# Patient Record
Sex: FEMALE | Race: WHITE | Employment: OTHER | URBAN - METROPOLITAN AREA
[De-identification: names, ages, dates, MRNs, and addresses within clinical notes are randomized per-mention and may not be internally consistent; named-entity substitution may affect disease eponyms.]

---

## 2017-02-15 ENCOUNTER — OFFICE VISIT (OUTPATIENT)
Dept: INTERNAL MEDICINE CLINIC | Age: 77
End: 2017-02-15

## 2017-02-15 VITALS
WEIGHT: 185 LBS | OXYGEN SATURATION: 98 % | TEMPERATURE: 97.8 F | DIASTOLIC BLOOD PRESSURE: 80 MMHG | HEART RATE: 77 BPM | HEIGHT: 61 IN | BODY MASS INDEX: 34.93 KG/M2 | SYSTOLIC BLOOD PRESSURE: 180 MMHG | RESPIRATION RATE: 16 BRPM

## 2017-02-15 DIAGNOSIS — Z13.39 SCREENING FOR ALCOHOLISM: ICD-10-CM

## 2017-02-15 DIAGNOSIS — E11.9 DIABETES MELLITUS WITHOUT COMPLICATION (HCC): ICD-10-CM

## 2017-02-15 DIAGNOSIS — Z12.31 ENCOUNTER FOR SCREENING MAMMOGRAM FOR BREAST CANCER: ICD-10-CM

## 2017-02-15 DIAGNOSIS — E78.00 PURE HYPERCHOLESTEROLEMIA: ICD-10-CM

## 2017-02-15 DIAGNOSIS — Z00.00 ROUTINE GENERAL MEDICAL EXAMINATION AT A HEALTH CARE FACILITY: ICD-10-CM

## 2017-02-15 DIAGNOSIS — Z00.00 MEDICARE ANNUAL WELLNESS VISIT, SUBSEQUENT: Primary | ICD-10-CM

## 2017-02-15 DIAGNOSIS — M85.80 OSTEOPENIA: ICD-10-CM

## 2017-02-15 NOTE — PROGRESS NOTES
1. Have you been to the ER, urgent care clinic since your last visit? Hospitalized since your last visit? No    2. Have you seen or consulted any other health care providers outside of the [address] [place] [address] since your last visit? Include any pap smears or colon screening.  No   Chief Complaint   Patient presents with   [facility] Annual Wellness Visit     Not fasting

## 2017-02-15 NOTE — PROGRESS NOTES
This is a Subsequent Medicare Annual Wellness Visit providing Personalized Prevention Plan Services (PPPS) (Performed 12 months after initial AWV and PPPS )    I have reviewed the patient's medical history in detail and updated the computerized patient record. History   Here for a pe. She has controlled dm with fbg of 97. She is on insulin and metformin with 30 units am 20 units pm. She has white coat htn off her arb. She is on a statin. She walks daily. No etoh. No cp. She is up to date with c-scope but is due for a mammogram. She has mild osteopenia 2014. Past Medical History   Diagnosis Date    Diabetes ([facility]     Fatty liver     GERD (gastroesophageal reflux disease)     Hypercholesterolemia     Osteopenia 2010    White coat hypertension       Past Surgical History   Procedure Laterality Date    Hx orlin and bso  1987    Hx cataract removal Bilateral 8/2012    Hx colonoscopy  2011     Current Outpatient Prescriptions   Medication Sig Dispense Refill    metFORMIN ER (GLUCOPHAGE XR) 500 mg tablet Take 1 Tab by mouth daily (with dinner). 90 Tab 1    NOVOLIN 70/30 100 unit/mL (70-30) injection INJECT 40 UNITS UNDER THE SKIN EVERY MORNING AND 20 UNITS EVERY EVENING 20 mL 11    atorvastatin (LIPITOR) 80 mg tablet Take 1 Tab by mouth nightly. 90 Tab 1    Insulin Syringe-Needle U-100 (INSULIN SYRINGE) 1/2 mL 30 x 5/16\" syrg 1 Each by Does Not Apply route two (2) times a day. 180 Syringe 3    cyanocobalamin (VITAMIN B-12) 1,000 mcg tablet Take 1,000 mcg by mouth daily.  calcium-cholecalciferol, D3, (CALTRATE 600+D) tablet Take 1 Tab by mouth daily.  MULTIVITAMIN W-MINERALS/LUTEIN (CENTRUM SILVER PO) Take  by mouth. Takes one po daily       CRANBERRY CONC/ASCORBIC ACID (CRANBERRY PLUS VITAMIN C PO) Take 2 Tabs by mouth two (2) times a day.  aspirin 81 mg tablet Take 81 mg by mouth daily as needed.        Allergies   Allergen Reactions    Ace Inhibitors Nausea Only    Glucophage [Metformin] Other (comments)     CURRENTLY TAKES METFORMIN     Family History   Problem Relation Age of Onset    Diabetes Sister     Other Sister      arthritis    Heart Disease Mother     Diabetes Mother     Stroke Father      Social History   Substance Use Topics    Smoking status: Never Smoker    Smokeless tobacco: Never Used    Alcohol use No     Patient Active Problem List   Diagnosis Code    Fatty liver K76.0    White coat hypertension R03.0    Diabetes mellitus without complication ([facility] [address] I46.1    Osteopenia M85.80    Pure hypercholesterolemia E78.00       Depression Risk Factor Screening:     PHQ 2 / 9, over the last two weeks 2/15/2017   Little interest or pleasure in doing things Not at all   Feeling down, depressed or hopeless Not at all   Total Score PHQ 2 0     Alcohol Risk Factor Screening: On any occasion during the past 3 months, have you had more than 3 drinks containing alcohol? No    Do you average more than 7 drinks per week? No      Functional Ability and Level of Safety:     Hearing Loss   none    Activities of Daily Living   Self-care. Requires assistance with: no ADLs    Fall Risk     Fall Risk Assessment, last 12 mths 2/15/2017   Able to walk? Yes   Fall in past 12 months? No     Abuse Screen   Patient is not abused    Review of Systems   A comprehensive review of systems was negative except for that written in the HPI.     Physical Examination     Evaluation of Cognitive Function:  Mood/affect:  happy  Appearance: age appropriate  Family member/caregiver input: none    Visit Vitals    /80 (BP 1 Location: Left arm, BP Patient Position: At rest)    Pulse 77    Temp 97.8 °F (36.6 °C) (Oral)    Resp 16    Ht 5' 0.5\" (1.537 m)    Wt 185 lb (83.9 kg)    SpO2 98%    BMI 35.54 kg/m2   176/80 her cuff      General appearance: alert, cooperative, no distress, appears stated age  Lungs: clear to auscultation bilaterally  Heart: regular rate and rhythm, S1, S2 normal, no murmur, click, rub or gallop    Patient Care Team:  Prachi Kong MD as PCP - General  Homa Solano MD (Ophthalmology)    Advice/Referrals/Counseling   Education and counseling provided:  Are appropriate based on today's review and evaluation    Assessment/Plan   Oz Wilburn was seen today for annual wellness visit. Diagnoses and all orders for this visit:    Medicare annual wellness visit, subsequent    Diabetes mellitus without complication ([facility] [address]  -     HEMOGLOBIN A1C WITH EAG  -     METABOLIC PANEL, COMPREHENSIVE  -     MICROALBUMIN, UR, RAND W/ MICROALBUMIN/CREA RATIO  -     CBC WITH AUTOMATED DIFF  Reduce insulin to 15 units PM.    Pure hypercholesterolemia  -     LIPID PANEL  The current medical regimen is effective;  continue present plan and medications. White coat hypertension  -     AMB POC EKG ROUTINE W/ 12 LEADS, INTER & REP  Monitor at home. Encounter for screening mammogram for breast cancer  -     PATRICIA MAMMO BI SCREENING INCL CAD; Future    Osteopenia  Recheck two years. Reviewed plan of care with the patient who has provided input and agrees with the goals  .

## 2017-02-15 NOTE — MR AVS SNAPSHOT
Visit Information Date & Time Provider Department Dept. Phone Encounter #  
 2/15/2017  2:00 PM Williams Marcum MD [facility] [phone] [id number] Follow-up Instructions Return in about 3 months (around 5/15/2017). Follow-up and Disposition History Your Appointments 5/17/2017  9:15 AM  
ROUTINE CARE with Williams Marcum MD  
[facility] [address]) Appt Note: f/up w labs [place] [address] [facility] [address]  
[address] [facility] [address] [address] Upcoming Health Maintenance Date Due DTaP/Tdap/Td series (1 - Tdap) 12/19/1961 MICROALBUMIN Q1 2/9/2017 HEMOGLOBIN A1C Q6M 5/2/2017 FOOT EXAM Q1 5/20/2017 MEDICARE YEARLY EXAM 5/21/2017 LIPID PANEL Q1 11/2/2017 EYE EXAM RETINAL OR DILATED Q1 1/13/2018 GLAUCOMA SCREENING Q2Y 1/13/2019 COLONOSCOPY 1/31/2022 Allergies as of 2/15/2017  Review Complete On: 2/15/2017 By: Es Vasquez Severity Noted Reaction Type Reactions Ace Inhibitors  11/06/2012    Nausea Only Glucophage [Metformin]  04/12/2010    Other (comments) CURRENTLY TAKES METFORMIN Current Immunizations  Reviewed on 9/15/2016 Name Date Influenza High Dose Vaccine PF 9/13/2016, 9/7/2015 Influenza Vaccine 9/24/2014, 9/12/2013 Influenza Vaccine Split 10/24/2012 Pneumococcal Conjugate (PCV-13) 9/7/2015 Pneumococcal Vaccine (Unspecified Type) 11/6/2009, 6/8/2009 Zoster Vaccine, Live 10/1/2013 Not reviewed this visit You Were Diagnosed With   
  
 Codes Comments Medicare annual wellness visit, subsequent    -  Primary ICD-10-CM: Z00.00 ICD-9-CM: V70.0 [facility] mellitus [facility] complication [facility]     KIARA-47-GT: E11.9 ICD-9-CM: 250.00 Pure hypercholesterolemia     ICD-10-CM: E78.00 ICD-9-CM: 272.0 White coat hypertension     ICD-10-CM: R03.0 ICD-9-CM: 796.2 Encounter for screening mammogram for breast cancer     ICD-10-CM: Z12.31 
ICD-9-CM: V76.12 Osteopenia     ICD-10-CM: M85.80 ICD-9-CM: 733.90 Routine general medical examination at a health care facility     ICD-10-CM: Z00.00 ICD-9-CM: V70.0 Screening for alcoholism     ICD-10-CM: Z13.89 ICD-9-CM: V79.1 Vitals BP Pulse Temp Resp Height(growth percentile) Weight(growth percentile) 180/80 (BP 1 Location: Left arm, BP Patient Position: At rest) 77 97.8 °F (36.6 °C) (Oral) 16 5' 0.5\" (1.537 m) 185 lb (83.9 kg) SpO2 BMI OB Status Smoking Status 98% 35.54 kg/m2 Hysterectomy Never Smoker Vitals History BMI and BSA Data Body Mass Index Body Surface Area 35.54 kg/m 2 1.89 m 2 Preferred Pharmacy Pharmacy Name Phone [address] [phone] Your Updated Medication List  
  
   
This list is accurate as of: 2/15/17  2:38 PM.  Always use your most recent med list.  
  
  
  
  
 aspirin 81 mg tablet Take 81 mg by mouth daily as needed. atorvastatin 80 mg tablet Commonly known as:  LIPITOR Take 1 Tab by mouth nightly. calcium-cholecalciferol (D3) tablet Commonly known as:  CALTRATE 600+D Take 1 Tab by mouth daily. CENTRUM SILVER PO Take  by mouth. Takes one po daily CRANBERRY PLUS VITAMIN C PO Take 2 Tabs by mouth two (2) times a day. Insulin Syringe-Needle U-100 1/2 mL 30 gauge x 5/16 Syrg Commonly known as:  INSULIN SYRINGE  
1 Each by Does Not Apply route two (2) times a day. metFORMIN  mg tablet Commonly known as:  GLUCOPHAGE XR Take 1 Tab by mouth daily (with dinner). NovoLIN 70/30 100 unit/mL (70-30) injection Generic drug:  insulin NPH/insulin regular INJECT 40 UNITS UNDER THE SKIN EVERY MORNING AND 20 UNITS EVERY EVENING  
  
 VITAMIN B-12 1,000 mcg tablet Generic drug:  cyanocobalamin Take 1,000 mcg by mouth daily. We Performed the Following AMB POC EKG ROUTINE W/ 12 LEADS, INTER & REP [66586 CPT(R)] CBC WITH AUTOMATED DIFF [96364 CPT(R)] HEMOGLOBIN A1C WITH EAG [25651 CPT(R)] LIPID PANEL [24495 CPT(R)] METABOLIC PANEL, COMPREHENSIVE [21224 CPT(R)] MICROALBUMIN, UR, RAND W/ MICROALBUMIN/CREA RATIO [id number] CPT(R)] Follow-up Instructions Return in about 3 months (around 5/15/2017). To-Do List   
 02/15/2017 Imaging:  PATRICIA MAMMO BI SCREENING INCL CAD Introducing [facility] & HEALTH SERVICES! [facility] introduces [employer]ish patient portal. Now you can access parts of your medical record, email your doctor's office, and request medication refills online. 1. In your internet browser, go to [email or web address] 2. Click on the First Time User? Click Here link in the Sign In box. You will see the New Member Sign Up page. 3. Enter your [employer]ish Access Code exactly as it appears below. You will not need to use this code after youve completed the sign-up process. If you do not sign up before the expiration date, you must request a new code. · [employer] Access Code: [email or web address] Expires: 5/16/2017  2:37 PM 
 
4. Enter the last four digits of your Social Security Number (xxxx) and Date of Birth (mm/dd/yyyy) as indicated and click Submit. You will be taken to the next sign-up page. 5. Create a addwish ID. This will be your addwish login ID and cannot be changed, so think of one that is secure and easy to remember. 6. Create a addwish password. You can change your password at any time. 7. Enter your Password Reset Question and Answer. This can be used at a later time if you forget your password. 8. Enter your e-mail address. You will receive e-mail notification when new information is available in [address] 9. Click Sign Up. You can now view and download portions of your medical record. 10. Click the Download Summary menu link to download a portable copy of your medical information. If you have questions, please visit the Frequently Asked Questions section of the [employer] website. Remember, [employer] is NOT to be used for urgent needs. For medical emergencies, dial 911. Now available from your iPhone and Android! Please provide this summary of care documentation to your next provider. Your primary care clinician is listed as [address] [place] [address] [address] If you have any questions after today's visit, please call [phone].

## 2017-02-22 DIAGNOSIS — E11.9 DIABETES MELLITUS WITHOUT COMPLICATION (HCC): ICD-10-CM

## 2017-02-22 RX ORDER — NAPROXEN SODIUM 220 MG
TABLET ORAL
Qty: 180 SYRINGE | Refills: 3 | Status: SHIPPED | OUTPATIENT
Start: 2017-02-22 | End: 2018-02-14 | Stop reason: SDUPTHER

## 2017-03-03 ENCOUNTER — HOSPITAL ENCOUNTER (OUTPATIENT)
Dept: LAB | Age: 77
Discharge: HOME | End: 2017-03-03
Payer: MEDICARE

## 2017-03-03 PROCEDURE — 80053 COMPREHEN METABOLIC PANEL: CPT

## 2017-03-03 PROCEDURE — 36415 COLL VENOUS BLD VENIPUNCTURE: CPT

## 2017-03-03 PROCEDURE — 80061 LIPID PANEL: CPT

## 2017-03-03 PROCEDURE — 83036 HEMOGLOBIN GLYCOSYLATED A1C: CPT

## 2017-03-03 PROCEDURE — 82043 UR ALBUMIN QUANTITATIVE: CPT

## 2017-03-03 PROCEDURE — 85025 COMPLETE CBC W/AUTO DIFF WBC: CPT

## 2017-03-04 LAB
ALBUMIN SERPL-MCNC: 4.4 G/DL (ref 3.5–4.8)
ALBUMIN/CREAT UR: 11.6 MG/G CREAT (ref 0–30)
ALBUMIN/GLOB SERPL: 1.7 {RATIO} (ref 1.1–2.5)
ALP SERPL-CCNC: 97 IU/L (ref 39–117)
ALT SERPL-CCNC: 18 IU/L (ref 0–32)
AST SERPL-CCNC: 16 IU/L (ref 0–40)
BASOPHILS # BLD AUTO: 0 X10E3/UL (ref 0–0.2)
BASOPHILS NFR BLD AUTO: 0 %
BILIRUB SERPL-MCNC: 0.7 MG/DL (ref 0–1.2)
BUN SERPL-MCNC: 15 MG/DL (ref 8–27)
BUN/CREAT SERPL: 17 (ref 11–26)
CALCIUM SERPL-MCNC: 9.7 MG/DL (ref 8.7–10.3)
CHLORIDE SERPL-SCNC: 98 MMOL/L (ref 96–106)
CHOLEST SERPL-MCNC: 158 MG/DL (ref 100–199)
CO2 SERPL-SCNC: 26 MMOL/L (ref 18–29)
CREAT SERPL-MCNC: 0.86 MG/DL (ref 0.57–1)
CREAT UR-MCNC: 51.9 MG/DL
EOSINOPHIL # BLD AUTO: 0.1 X10E3/UL (ref 0–0.4)
EOSINOPHIL NFR BLD AUTO: 1 %
ERYTHROCYTE [DISTWIDTH] IN BLOOD BY AUTOMATED COUNT: 13 % (ref 12.3–15.4)
EST. AVERAGE GLUCOSE BLD GHB EST-MCNC: 134 MG/DL
GLOBULIN SER CALC-MCNC: 2.6 G/DL (ref 1.5–4.5)
GLUCOSE SERPL-MCNC: 131 MG/DL (ref 65–99)
HBA1C MFR BLD: 6.3 % (ref 4.8–5.6)
HCT VFR BLD AUTO: 40.4 % (ref 34–46.6)
HDLC SERPL-MCNC: 46 MG/DL
HGB BLD-MCNC: 13.7 G/DL (ref 11.1–15.9)
IMM GRANULOCYTES # BLD: 0 X10E3/UL (ref 0–0.1)
IMM GRANULOCYTES NFR BLD: 0 %
INTERPRETATION, 910389: NORMAL
LDLC SERPL CALC-MCNC: 78 MG/DL (ref 0–99)
LYMPHOCYTES # BLD AUTO: 2.4 X10E3/UL (ref 0.7–3.1)
LYMPHOCYTES NFR BLD AUTO: 41 %
Lab: NORMAL
MCH RBC QN AUTO: 30.2 PG (ref 26.6–33)
MCHC RBC AUTO-ENTMCNC: 33.9 G/DL (ref 31.5–35.7)
MCV RBC AUTO: 89 FL (ref 79–97)
MICROALBUMIN UR-MCNC: 6 UG/ML
MONOCYTES # BLD AUTO: 0.3 X10E3/UL (ref 0.1–0.9)
MONOCYTES NFR BLD AUTO: 5 %
NEUTROPHILS # BLD AUTO: 3.1 X10E3/UL (ref 1.4–7)
NEUTROPHILS NFR BLD AUTO: 53 %
PLATELET # BLD AUTO: 155 X10E3/UL (ref 150–379)
POTASSIUM SERPL-SCNC: 4.5 MMOL/L (ref 3.5–5.2)
PROT SERPL-MCNC: 7 G/DL (ref 6–8.5)
RBC # BLD AUTO: 4.54 X10E6/UL (ref 3.77–5.28)
SODIUM SERPL-SCNC: 137 MMOL/L (ref 134–144)
TRIGL SERPL-MCNC: 171 MG/DL (ref 0–149)
VLDLC SERPL CALC-MCNC: 34 MG/DL (ref 5–40)
WBC # BLD AUTO: 5.8 X10E3/UL (ref 3.4–10.8)

## 2017-03-17 ENCOUNTER — HOSPITAL ENCOUNTER (OUTPATIENT)
Dept: MAMMOGRAPHY | Age: 77
Discharge: HOME | End: 2017-03-17
Payer: MEDICARE

## 2017-03-17 DIAGNOSIS — Z12.31 ENCOUNTER FOR SCREENING MAMMOGRAM FOR BREAST CANCER: ICD-10-CM

## 2017-03-17 PROCEDURE — 77067 SCR MAMMO BI INCL CAD: CPT

## 2017-05-17 ENCOUNTER — OFFICE VISIT (OUTPATIENT)
Dept: INTERNAL MEDICINE CLINIC | Age: 77
End: 2017-05-17

## 2017-05-17 VITALS
RESPIRATION RATE: 16 BRPM | DIASTOLIC BLOOD PRESSURE: 81 MMHG | HEART RATE: 81 BPM | TEMPERATURE: 97.1 F | SYSTOLIC BLOOD PRESSURE: 158 MMHG | WEIGHT: 180.2 LBS | OXYGEN SATURATION: 98 % | HEIGHT: 60 IN | BODY MASS INDEX: 35.38 KG/M2

## 2017-05-17 DIAGNOSIS — E78.00 PURE HYPERCHOLESTEROLEMIA: ICD-10-CM

## 2017-05-17 DIAGNOSIS — R03.0 WHITE COAT SYNDROME WITHOUT HYPERTENSION: ICD-10-CM

## 2017-05-17 DIAGNOSIS — E11.9 DIABETES MELLITUS WITHOUT COMPLICATION (HCC): Primary | ICD-10-CM

## 2017-05-17 NOTE — PROGRESS NOTES
HISTORY OF PRESENT ILLNESS  Robert Gaona is a 78 y.o. female. HPI  Here for dm. She is well controlled on insulin 40 units am 20 units pm. She gets some hypoglycemia. She is on a statin for hld. She has white coat htn. She is active gardening. No cp. Past Medical History:   Diagnosis Date    Diabetes ([facility] [address]     Fatty liver     GERD (gastroesophageal reflux disease)     Hypercholesterolemia     Osteopenia 2010    White coat hypertension      Current Outpatient Prescriptions   Medication Sig    atorvastatin (LIPITOR) 80 mg tablet TAKE 1 TABLET BY MOUTH EVERY EVENING    Insulin Syringe-Needle U-100 1/2 mL 30 gauge syrg USE AS DIRECTED TWICE DAILY    metFORMIN ER (GLUCOPHAGE XR) 500 mg tablet Take 1 Tab by mouth daily (with dinner).  NOVOLIN 70/30 100 unit/mL (70-30) injection INJECT 40 UNITS UNDER THE SKIN EVERY MORNING AND 20 UNITS EVERY EVENING    cyanocobalamin (VITAMIN B-12) 1,000 mcg tablet Take 1,000 mcg by mouth daily.  calcium-cholecalciferol, D3, (CALTRATE 600+D) tablet Take 1 Tab by mouth daily.  CRANBERRY CONC/ASCORBIC ACID (CRANBERRY PLUS VITAMIN C PO) Take 2 Tabs by mouth two (2) times a day.  aspirin 81 mg tablet Take 81 mg by mouth daily as needed.  MULTIVITAMIN W-MINERALS/LUTEIN (CENTRUM SILVER PO) Take  by mouth. Takes one po daily      No current facility-administered medications for this visit. Review of Systems   All other systems reviewed and are negative. Visit Vitals    /81 (BP 1 Location: Left arm, BP Patient Position: At rest)    Pulse 81    Temp 97.1 °F (36.2 °C) (Oral)    Resp 16    Ht 5' (1.524 m)    Wt 180 lb 3.2 oz (81.7 kg)    SpO2 98%    BMI 35.19 kg/m2       Physical Exam   Constitutional: She appears well-developed and well-nourished. Cardiovascular: Normal rate, regular rhythm and normal heart sounds. Pulmonary/Chest: Effort normal and breath sounds normal. No respiratory distress. She has no wheezes. She has no rales. Musculoskeletal:   Foot exam - bilateral normal; no swelling, tenderness or skin or vascular lesions. Color and temperature is normal. Sensation is intact. Peripheral pulses are palpable. Toenails are normal.     Nursing note and vitals reviewed. Lab Results   Component Value Date/Time    Hemoglobin A1c 6.3 03/03/2017 09:40 AM    Hemoglobin A1c (POC) 8.0 08/04/2015 09:44 AM     Lab Results   Component Value Date/Time    Cholesterol, total 158 03/03/2017 09:40 AM    HDL Cholesterol 46 03/03/2017 09:40 AM    LDL, calculated 78 03/03/2017 09:40 AM    VLDL, calculated 34 03/03/2017 09:40 AM    Triglyceride 171 03/03/2017 09:40 AM    CHOL/HDL Ratio 4.1 09/08/2010 10:15 AM       ASSESSMENT and Evaline Lines was seen today for diabetes. Diagnoses and all orders for this visit:    Diabetes mellitus without complication ([facility] [address]  -     HEMOGLOBIN A1C WITH EAG   reduce insulin to 30 units am 15 units pm. Reduce by 5 units if BG<100 with these changes. Pure hypercholesterolemia  -     METABOLIC PANEL, COMPREHENSIVE  -     LIPID PANEL   The current medical regimen is effective;  continue present plan and medications.  Diabetic Foot Exam.    White coat syndrome without hypertension  No Rx needed. Bring cuff next visit.       Reviewed plan of care with the patient who has provided input and agrees with the goals

## 2017-05-17 NOTE — PROGRESS NOTES
1. Have you been to the ER, urgent care clinic since your last visit? Hospitalized since your last visit? No    2. Have you seen or consulted any other health care providers outside of the [address] [place] [address] since your last visit? Include any pap smears or colon screening.  No   Chief Complaint   Patient presents with    Diabetes     follow up     Fasting

## 2017-05-17 NOTE — MR AVS SNAPSHOT
Visit Information Date & Time Provider Department Dept. Phone Encounter #  
 5/17/2017  9:15 AM Gerhardt Duel, MD [facility] [phone] [id number] Upcoming Health Maintenance Date Due DTaP/Tdap/Td series (1 - Tdap) 12[birth year]19/1961 MEDICARE YEARLY EXAM 5/21/2017 INFLUENZA AGE 9 TO ADULT 8/1/2017 HEMOGLOBIN A1C Q6M 9/3/2017 EYE EXAM RETINAL OR DILATED Q1 1/13/2018 MICROALBUMIN Q1 3/3/2018 LIPID PANEL Q1 3/3/2018 GLAUCOMA SCREENING Q2Y 1/13/2019 COLONOSCOPY 1/31/2022 Allergies as of 5/17/2017  Review Complete On: 5/17/2017 By: Isaias Linda Severity Noted Reaction Type Reactions Ace Inhibitors  11/06/2012    Nausea Only Glucophage [Metformin]  04/12/2010    Other (comments) CURRENTLY TAKES METFORMIN Current Immunizations  Reviewed on 5/17/2017 Name Date Influenza High Dose Vaccine PF 9/13/2016, 9/7/2015 Influenza Vaccine 9/24/2014, 9/12/2013 Influenza Vaccine Split 10/24/2012 Pneumococcal Conjugate (PCV-13) 9/7/2015 Pneumococcal Vaccine (Unspecified Type) 11/6/2009, 6/8/2009 Zoster Vaccine, Live 10/1/2013 Reviewed by Gerhardt Duel, MD on 5/17/2017 at  9:11 AM  
You Were Diagnosed With   
  
 Codes Comments Diabetes mellitus without complication ([facility]    -  Primary ICD-10-CM: E11.9 ICD-9-CM: 250.00 Pure hypercholesterolemia     ICD-10-CM: E78.00 ICD-9-CM: 272.0 White coat syndrome without hypertension     ICD-10-CM: R03.0 ICD-9-CM: 796.2 Vitals BP Pulse Temp Resp Height(growth percentile) Weight(growth percentile) 158/81 (BP 1 Location: Left arm, BP Patient Position: At rest) 81 97.1 °F (36.2 °C) (Oral) 16 5' (1.524 m) 180 lb 3.2 oz (81.7 kg) SpO2 BMI OB Status Smoking Status 98% 35.19 kg/m2 Hysterectomy Never Smoker BMI and BSA Data Body Mass Index Body Surface Area  
 35.19 kg/m 2 1.86 m 2 Preferred Pharmacy Pharmacy Name Phone [address]  [phone] Your Updated Medication List  
  
   
This list is accurate as of: 5/17/17  9:32 AM.  Always use your most recent med list.  
  
  
  
  
 aspirin 81 mg tablet Take 81 mg by mouth daily as needed. atorvastatin 80 mg tablet Commonly known as:  LIPITOR  
TAKE 1 TABLET BY MOUTH EVERY EVENING  
  
 calcium-cholecalciferol (D3) tablet Commonly known as:  CALTRATE 600+D Take 1 Tab by mouth daily. CENTRUM SILVER PO Take  by mouth. Takes one po daily CRANBERRY PLUS VITAMIN C PO Take 2 Tabs by mouth two (2) times a day. Insulin Syringe-Needle U-100 1/2 mL 30 gauge Syrg USE AS DIRECTED TWICE DAILY  
  
 metFORMIN  mg tablet Commonly known as:  GLUCOPHAGE XR Take 1 Tab by mouth daily (with dinner). NovoLIN 70/30 100 unit/mL (70-30) injection Generic drug:  insulin NPH/insulin regular INJECT 40 UNITS UNDER THE SKIN EVERY MORNING AND 20 UNITS EVERY EVENING  
  
 VITAMIN B-12 1,000 mcg tablet Generic drug:  cyanocobalamin Take 1,000 mcg by mouth daily. We Performed the Following HEMOGLOBIN A1C WITH EAG [44017 CPT(R)] LIPID PANEL [70086 CPT(R)] METABOLIC PANEL, COMPREHENSIVE [14392 CPT(R)] Introducing [facility] & HEALTH SERVICES! [facility] introduces [employer] patient portal. Now you can access parts of your medical record, email your doctor's office, and request medication refills online. 1. In your internet browser, go to [email or web address] 2. Click on the First Time User? Click Here link in the Sign In box. You will see the New Member Sign Up page. 3. Enter your [employer] Access Code exactly as it appears below. You will not need to use this code after youve completed the sign-up process. If you do not sign up before the expiration date, you must request a new code. · [employer] Access Code: [email or web address] Expires: 8/15/2017  9:32 AM 
 
4. Enter the last four digits of your Social Security Number (xxxx) and Date of Birth (mm/dd/yyyy) as indicated and click Submit. You will be taken to the next sign-up page. 5. Create a [employer] ID. This will be your [employer] login ID and cannot be changed, so think of one that is secure and easy to remember. 6. Create a [employer] password. You can change your password at any time. 7. Enter your Password Reset Question and Answer. This can be used at a later time if you forget your password. 8. Enter your e-mail address. You will receive e-mail notification when new information is available in [address] 9. Click Sign Up. You can now view and download portions of your medical record. 10. Click the Download Summary menu link to download a portable copy of your medical information. If you have questions, please visit the Frequently Asked Questions section of the [employer] website. Remember, [employer] is NOT to be used for urgent needs. For medical emergencies, dial 911. Now available from your iPhone and Android! Please provide this summary of care documentation to your next provider. Your primary care clinician is listed as [address] [address] [place] [address] [address]. If you have any questions after today's visit, please call [phone].

## 2017-06-16 ENCOUNTER — HOSPITAL ENCOUNTER (OUTPATIENT)
Dept: LAB | Age: 77
Discharge: HOME | End: 2017-06-16
Payer: MEDICARE

## 2017-06-16 ENCOUNTER — OFFICE VISIT (OUTPATIENT)
Dept: INTERNAL MEDICINE CLINIC | Age: 77
End: 2017-06-16

## 2017-06-16 ENCOUNTER — TELEPHONE (OUTPATIENT)
Dept: INTERNAL MEDICINE CLINIC | Age: 77
End: 2017-06-16

## 2017-06-16 VITALS
BODY MASS INDEX: 36.05 KG/M2 | HEIGHT: 60 IN | HEART RATE: 73 BPM | DIASTOLIC BLOOD PRESSURE: 74 MMHG | RESPIRATION RATE: 18 BRPM | WEIGHT: 183.6 LBS | SYSTOLIC BLOOD PRESSURE: 169 MMHG | TEMPERATURE: 97.4 F | OXYGEN SATURATION: 98 %

## 2017-06-16 DIAGNOSIS — R03.0 WHITE COAT SYNDROME WITHOUT HYPERTENSION: ICD-10-CM

## 2017-06-16 DIAGNOSIS — Z71.89 LIVING WILL, COUNSELING/DISCUSSION: ICD-10-CM

## 2017-06-16 DIAGNOSIS — Z23 NEED FOR TDAP VACCINATION: ICD-10-CM

## 2017-06-16 DIAGNOSIS — E78.00 PURE HYPERCHOLESTEROLEMIA: ICD-10-CM

## 2017-06-16 DIAGNOSIS — E11.9 DIABETES MELLITUS WITHOUT COMPLICATION (HCC): Primary | ICD-10-CM

## 2017-06-16 PROCEDURE — 36415 COLL VENOUS BLD VENIPUNCTURE: CPT

## 2017-06-16 PROCEDURE — 80053 COMPREHEN METABOLIC PANEL: CPT

## 2017-06-16 PROCEDURE — 83036 HEMOGLOBIN GLYCOSYLATED A1C: CPT

## 2017-06-16 PROCEDURE — 80061 LIPID PANEL: CPT

## 2017-06-16 NOTE — PROGRESS NOTES
HISTORY OF PRESENT ILLNESS  Nelida Matthews is a 78 y.o. female. HPI  Here for dm. She is well controlled on insulin. We reduced her evening dose last visit. No hypoglycemia. She is on a statin for hld. She has white coat htn. She has a living will. She needs tdap. Past Medical History:   Diagnosis Date    Diabetes ([facility] [address] [facility]     Fatty liver     GERD (gastroesophageal reflux disease)     Hypercholesterolemia     Osteopenia 2010    White coat hypertension    ]  Current Outpatient Prescriptions   Medication Sig    atorvastatin (LIPITOR) 80 mg tablet TAKE 1 TABLET BY MOUTH EVERY EVENING    Insulin Syringe-Needle U-100 1/2 mL 30 gauge syrg USE AS DIRECTED TWICE DAILY    metFORMIN ER (GLUCOPHAGE XR) 500 mg tablet Take 1 Tab by mouth daily (with dinner).  NOVOLIN 70/30 100 unit/mL (70-30) injection INJECT 40 UNITS UNDER THE SKIN EVERY MORNING AND 20 UNITS EVERY EVENING    cyanocobalamin (VITAMIN B-12) 1,000 mcg tablet Take 1,000 mcg by mouth daily.  calcium-cholecalciferol, D3, (CALTRATE 600+D) tablet Take 1 Tab by mouth daily.  MULTIVITAMIN W-MINERALS/LUTEIN (CENTRUM SILVER PO) Take  by mouth. Takes one po daily     CRANBERRY CONC/ASCORBIC ACID (CRANBERRY PLUS VITAMIN C PO) Take 2 Tabs by mouth two (2) times a day.  aspirin 81 mg tablet Take 81 mg by mouth daily as needed. No current facility-administered medications for this visit. Review of Systems   All other systems reviewed and are negative. Visit Vitals    /74 (BP 1 Location: Right arm, BP Patient Position: At rest)    Pulse 73    Temp 97.4 °F (36.3 °C) (Oral)    Resp 18    Ht 5' (1.524 m)    Wt 183 lb 9.6 oz (83.3 kg)    SpO2 98%    BMI 35.86 kg/m2       Physical Exam   Constitutional: She appears well-developed and well-nourished. Cardiovascular: Normal rate, regular rhythm and normal heart sounds. Pulmonary/Chest: Effort normal and breath sounds normal. No respiratory distress. She has no wheezes.  She has no rales. Nursing note and vitals reviewed. Lab Results   Component Value Date/Time    Hemoglobin A1c 6.3 03/03/2017 09:40 AM    Hemoglobin A1c (POC) 8.0 08/04/2015 09:44 AM     Lab Results   Component Value Date/Time    Cholesterol, total 158 03/03/2017 09:40 AM    HDL Cholesterol 46 03/03/2017 09:40 AM    LDL, calculated 78 03/03/2017 09:40 AM    VLDL, calculated 34 03/03/2017 09:40 AM    Triglyceride 171 03/03/2017 09:40 AM    CHOL/HDL Ratio 4.1 09/08/2010 10:15 AM       ASSESSMENT and Sanjeev Garcia was seen today for annual wellness visit. Diagnoses and all orders for this visit:    Diabetes mellitus without complication ([facility] [address] [facility]  The current medical regimen is effective;  continue present plan and medications. Pure hypercholesterolemia  The current medical regimen is effective;  continue present plan and medications. White coat syndrome without hypertension  Monitor at home. Need for Tdap vaccination  Rx given. Living will, counseling/discussion   she has already.       Reviewed plan of care with the patient who has provided input and agrees with the goals

## 2017-06-16 NOTE — PROGRESS NOTES
1. Have you been to the ER, urgent care clinic since your last visit? Hospitalized since your last visit? No    2. Have you seen or consulted any other health care providers outside of the [address] [place] [address] since your last visit? Include any pap smears or colon screening.  No   Chief Complaint   Patient presents with   [facility] Annual Wellness Visit     Not fasting

## 2017-06-17 LAB
ALBUMIN SERPL-MCNC: 4.6 G/DL (ref 3.5–4.8)
ALBUMIN/GLOB SERPL: 1.8 {RATIO} (ref 1.2–2.2)
ALP SERPL-CCNC: 104 IU/L (ref 39–117)
ALT SERPL-CCNC: 20 IU/L (ref 0–32)
AST SERPL-CCNC: 19 IU/L (ref 0–40)
BILIRUB SERPL-MCNC: 0.7 MG/DL (ref 0–1.2)
BUN SERPL-MCNC: 20 MG/DL (ref 8–27)
BUN/CREAT SERPL: 20 (ref 12–28)
CALCIUM SERPL-MCNC: 10.1 MG/DL (ref 8.7–10.3)
CHLORIDE SERPL-SCNC: 100 MMOL/L (ref 96–106)
CHOLEST SERPL-MCNC: 201 MG/DL (ref 100–199)
CO2 SERPL-SCNC: 20 MMOL/L (ref 18–29)
CREAT SERPL-MCNC: 1 MG/DL (ref 0.57–1)
EST. AVERAGE GLUCOSE BLD GHB EST-MCNC: 148 MG/DL
GLOBULIN SER CALC-MCNC: 2.6 G/DL (ref 1.5–4.5)
GLUCOSE SERPL-MCNC: 144 MG/DL (ref 65–99)
HBA1C MFR BLD: 6.8 % (ref 4.8–5.6)
HDLC SERPL-MCNC: 51 MG/DL
INTERPRETATION, 910389: NORMAL
INTERPRETATION: NORMAL
LDLC SERPL CALC-MCNC: 101 MG/DL (ref 0–99)
Lab: NORMAL
PDF IMAGE, 910387: NORMAL
POTASSIUM SERPL-SCNC: 4.6 MMOL/L (ref 3.5–5.2)
PROT SERPL-MCNC: 7.2 G/DL (ref 6–8.5)
SODIUM SERPL-SCNC: 143 MMOL/L (ref 134–144)
TRIGL SERPL-MCNC: 245 MG/DL (ref 0–149)
VLDLC SERPL CALC-MCNC: 49 MG/DL (ref 5–40)

## 2017-09-21 ENCOUNTER — HOSPITAL ENCOUNTER (OUTPATIENT)
Dept: LAB | Age: 77
Discharge: HOME | End: 2017-09-21
Payer: MEDICARE

## 2017-09-21 ENCOUNTER — OFFICE VISIT (OUTPATIENT)
Dept: INTERNAL MEDICINE CLINIC | Age: 77
End: 2017-09-21

## 2017-09-21 VITALS
HEART RATE: 78 BPM | HEIGHT: 60 IN | DIASTOLIC BLOOD PRESSURE: 74 MMHG | OXYGEN SATURATION: 97 % | SYSTOLIC BLOOD PRESSURE: 158 MMHG | TEMPERATURE: 97.4 F | WEIGHT: 183 LBS | BODY MASS INDEX: 35.93 KG/M2 | RESPIRATION RATE: 16 BRPM

## 2017-09-21 DIAGNOSIS — E78.00 PURE HYPERCHOLESTEROLEMIA: ICD-10-CM

## 2017-09-21 DIAGNOSIS — R03.0 WHITE COAT SYNDROME WITHOUT HYPERTENSION: ICD-10-CM

## 2017-09-21 DIAGNOSIS — E11.9 DIABETES MELLITUS WITHOUT COMPLICATION (HCC): Primary | ICD-10-CM

## 2017-09-21 PROCEDURE — 36415 COLL VENOUS BLD VENIPUNCTURE: CPT

## 2017-09-21 PROCEDURE — 83036 HEMOGLOBIN GLYCOSYLATED A1C: CPT

## 2017-09-21 PROCEDURE — 80053 COMPREHEN METABOLIC PANEL: CPT

## 2017-09-21 PROCEDURE — 80061 LIPID PANEL: CPT

## 2017-09-21 NOTE — PROGRESS NOTES
HISTORY OF PRESENT ILLNESS  Wandy Sosa is a 78 y.o. female. HPI  Here for DM. She is well controlled on insulin with oral agents and checks her BG twice daily with readings 113-160. No hypoglycemia since we reduced her dose. Her BP is normal at 130/80 at home. She is on a statin for HLD. Past Medical History:   Diagnosis Date    Diabetes ([address]     Fatty liver     GERD (gastroesophageal reflux disease)     Hypercholesterolemia     Osteopenia 2010    White coat hypertension      Current Outpatient Prescriptions on File Prior to Visit   Medication Sig Dispense Refill    metFORMIN ER (GLUCOPHAGE XR) 500 mg tablet TAKE 1 TABLET BY MOUTH DAILY WITH DINNER 90 Tab 1    atorvastatin (LIPITOR) 80 mg tablet TAKE 1 TABLET BY MOUTH EVERY EVENING 90 Tab 3    Insulin Syringe-Needle U-100 1/2 mL 30 gauge syrg USE AS DIRECTED TWICE DAILY 180 Syringe 3    NOVOLIN 70/30 100 unit/mL (70-30) injection INJECT 40 UNITS UNDER THE SKIN EVERY MORNING AND 20 UNITS EVERY EVENING 20 mL 11    cyanocobalamin (VITAMIN B-12) 1,000 mcg tablet Take 1,000 mcg by mouth daily.  MULTIVITAMIN W-MINERALS/LUTEIN (CENTRUM SILVER PO) Take  by mouth. Takes one po daily        No current facility-administered medications on file prior to visit. Review of Systems   All other systems reviewed and are negative. Visit Vitals    /74 (BP 1 Location: Left arm, BP Patient Position: At rest)    Pulse 78    Temp 97.4 °F (36.3 °C) (Oral)    Resp 16    Ht 5' (1.524 m)    Wt 183 lb (83 kg)    SpO2 97%    BMI 35.74 kg/m2       Physical Exam   Constitutional: She appears well-developed and well-nourished. Cardiovascular: Normal rate, regular rhythm and normal heart sounds. Pulmonary/Chest: Effort normal and breath sounds normal. No respiratory distress. She has no wheezes. She has no rales. Nursing note and vitals reviewed.     Lab Results   Component Value Date/Time    Hemoglobin A1c 6.8 06/16/2017 09:30 AM Hemoglobin A1c (POC) 8.0 08/04/2015 09:44 AM       ASSESSMENT and PLAN  Diagnoses and all orders for this visit:    1. Diabetes mellitus without complication ([facility]  -     HEMOGLOBIN A1C WITH EAG  The current medical regimen is effective;  continue present plan and medications. Gave her literature on carb counting. 2. Pure hypercholesterolemia  -     METABOLIC PANEL, COMPREHENSIVE  -     LIPID PANEL  The current medical regimen is effective;  continue present plan and medications. 3. White coat syndrome without hypertension  Doing fine off meds.     Reviewed plan of care with the patient who has provided input and agrees with the goals

## 2017-09-21 NOTE — PROGRESS NOTES
1. Have you been to the ER, urgent care clinic since your last visit? Hospitalized since your last visit?no    2. Have you seen or consulted any other health care providers outside of the [address] [place] [address] since your last visit? Include any pap smears or colon screening.  No    Chief Complaint   Patient presents with    Diabetes      4 months follow up    Cholesterol Problem     4 months follow up     Fasting

## 2017-09-21 NOTE — MR AVS SNAPSHOT
Visit Information Date & Time Provider Department Dept. Phone Encounter #  
 9/21/2017  9:15 AM Marisol Viera, [facility] [phone] [id number] Upcoming Health Maintenance Date Due DTaP/Tdap/Td series (1 - Tdap) 12/19/1961 MEDICARE YEARLY EXAM 5/21/2017 INFLUENZA AGE 9 TO ADULT 8/1/2017 HEMOGLOBIN A1C Q6M 12/16/2017 EYE EXAM RETINAL OR DILATED Q1 1/13/2018 MICROALBUMIN Q1 3/3/2018 LIPID PANEL Q1 6/16/2018 GLAUCOMA SCREENING Q2Y 1/13/2019 COLONOSCOPY 1/31/2022 Allergies as of 9/21/2017  Review Complete On: 9/21/2017 By: Eric Castillo Severity Noted Reaction Type Reactions Ace Inhibitors  11/06/2012    Nausea Only Glucophage [Metformin]  04/12/2010    Other (comments) CURRENTLY TAKES METFORMIN Current Immunizations  Reviewed on 5/17/2017 Name Date Influenza High Dose Vaccine PF 9/13/2016, 9/7/2015 Influenza Vaccine 9/24/2014, 9/12/2013 Influenza Vaccine Split 10/24/2012 Pneumococcal Conjugate (PCV-13) 9/7/2015 Pneumococcal Vaccine (Unspecified Type) 11/6/2009 Tdap 7/21/2017 ZZZ-RETIRED (DO NOT USE) Pneumococcal Vaccine (Unspecified Type) 6/8/2009 Zoster Vaccine, Live 10/1/2013 Not reviewed this visit You Were Diagnosed With   
  
 Codes Comments Diabetes mellitus without complication ([facility]    -  Primary ICD-10-CM: E11.9 ICD-9-CM: 250.00 Pure hypercholesterolemia     ICD-10-CM: E78.00 ICD-9-CM: 272.0 White coat syndrome without hypertension     ICD-10-CM: R03.0 ICD-9-CM: 796.2 Vitals BP Pulse Temp Resp Height(growth percentile) Weight(growth percentile) 158/74 (BP 1 Location: Left arm, BP Patient Position: At rest) 78 97.4 °F (36.3 °C) (Oral) 16 5' (1.524 m) 183 lb (83 kg) SpO2 BMI OB Status Smoking Status 97% 35.74 kg/m2 Hysterectomy Never Smoker BMI and BSA Data Body Mass Index Body Surface Area  35.74 kg/m 2 1.87 m 2  
  
  
 Preferred Pharmacy Pharmacy Name Phone [facility] [phone] Your Updated Medication List  
  
   
This list is accurate as of: 9/21/17  9:34 AM.  Always use your most recent med list.  
  
  
  
  
 atorvastatin 80 mg tablet Commonly known as:  LIPITOR  
TAKE 1 TABLET BY MOUTH EVERY EVENING  
  
 CENTRUM SILVER PO Take  by mouth. Takes one po daily Insulin Syringe-Needle U-100 1/2 mL 30 gauge Syrg USE AS DIRECTED TWICE DAILY  
  
 metFORMIN  mg tablet Commonly known as:  GLUCOPHAGE XR  
TAKE 1 TABLET BY MOUTH DAILY WITH DINNER NovoLIN 70/30 100 unit/mL (70-30) injection Generic drug:  insulin NPH/insulin regular INJECT 40 UNITS UNDER THE SKIN EVERY MORNING AND 20 UNITS EVERY EVENING  
  
 VITAMIN B-12 1,000 mcg tablet Generic drug:  cyanocobalamin Take 1,000 mcg by mouth daily. We Performed the Following HEMOGLOBIN A1C WITH EAG [05916 CPT(R)] LIPID PANEL [21372 CPT(R)] METABOLIC PANEL, COMPREHENSIVE [03112 CPT(R)] Introducing hospitals & HEALTH SERVICES! Niecy Yuan introduces Launch[employer] patient portal. Now you can access parts of your medical record, email your doctor's office, and request medication refills online. 1. In your internet browser, go to [email or web address]"[email or web address]. [email or web address]/"[employer] 2. Click on the First Time User? Click Here link in the Sign In box. You will see the New Member Sign Up page. 3. Enter your LaunchKey Access Code exactly as it appears below. You will not need to use this code after youve completed the sign-up process. If you do not sign up before the expiration date, you must request a new code. · [employer] Access Code: [email or web address] Expires: 12/20/2017  9:34 AM 
 
4. Enter the last four digits of your Social Security Number (xxxx) and Date of Birth (mm/dd/yyyy) as indicated and click Submit.  You will be taken to the next sign-up page. 5. Create a BlueKite ID. This will be your BlueKite login ID and cannot be changed, so think of one that is secure and easy to remember. 6. Create a BlueKite password. You can change your password at any time. 7. Enter your Password Reset Question and Answer. This can be used at a later time if you forget your password. 8. Enter your e-mail address. You will receive e-mail notification when new information is available in [address] 9. Click Sign Up. You can now view and download portions of your medical record. 10. Click the Download Summary menu link to download a portable copy of your medical information. If you have questions, please visit the Frequently Asked Questions section of the [employer] website. Remember, BlueKite is NOT to be used for urgent needs. For medical emergencies, dial 911. Now available from your iPhone and Android! Please provide this summary of care documentation to your next provider. Your primary care clinician is listed as [phone] [address] [place] [address] [address]. If you have any questions after today's visit, please call [phone].

## 2017-09-22 LAB
ALBUMIN SERPL-MCNC: 4.3 G/DL (ref 3.5–4.8)
ALBUMIN/GLOB SERPL: 1.6 {RATIO} (ref 1.2–2.2)
ALP SERPL-CCNC: 100 IU/L (ref 39–117)
ALT SERPL-CCNC: 18 IU/L (ref 0–32)
AST SERPL-CCNC: 20 IU/L (ref 0–40)
BILIRUB SERPL-MCNC: 0.9 MG/DL (ref 0–1.2)
BUN SERPL-MCNC: 18 MG/DL (ref 8–27)
BUN/CREAT SERPL: 21 (ref 12–28)
CALCIUM SERPL-MCNC: 9.5 MG/DL (ref 8.7–10.3)
CHLORIDE SERPL-SCNC: 100 MMOL/L (ref 96–106)
CHOLEST SERPL-MCNC: 154 MG/DL (ref 100–199)
CO2 SERPL-SCNC: 24 MMOL/L (ref 18–29)
CREAT SERPL-MCNC: 0.87 MG/DL (ref 0.57–1)
EST. AVERAGE GLUCOSE BLD GHB EST-MCNC: 134 MG/DL
GLOBULIN SER CALC-MCNC: 2.7 G/DL (ref 1.5–4.5)
GLUCOSE SERPL-MCNC: 144 MG/DL (ref 65–99)
HBA1C MFR BLD: 6.3 % (ref 4.8–5.6)
HDLC SERPL-MCNC: 44 MG/DL
INTERPRETATION, 910389: NORMAL
LDLC SERPL CALC-MCNC: 76 MG/DL (ref 0–99)
Lab: NORMAL
POTASSIUM SERPL-SCNC: 4.4 MMOL/L (ref 3.5–5.2)
PROT SERPL-MCNC: 7 G/DL (ref 6–8.5)
SODIUM SERPL-SCNC: 139 MMOL/L (ref 134–144)
TRIGL SERPL-MCNC: 170 MG/DL (ref 0–149)
VLDLC SERPL CALC-MCNC: 34 MG/DL (ref 5–40)

## 2018-02-14 DIAGNOSIS — E11.9 DIABETES MELLITUS WITHOUT COMPLICATION (HCC): ICD-10-CM

## 2018-02-14 RX ORDER — NAPROXEN SODIUM 220 MG
TABLET ORAL
Qty: 180 SYRINGE | Refills: 3 | Status: SHIPPED | OUTPATIENT
Start: 2018-02-14 | End: 2019-01-09 | Stop reason: SDUPTHER

## 2018-02-14 NOTE — TELEPHONE ENCOUNTER
[address] [phone] / [phone]  Pharmacy comments reads; patient requests new Rx, insulin syringes, 30G, 0.5CC, 8MM.

## 2018-02-21 RX ORDER — SYRING-NEEDL,DISP,INSUL,0.3 ML 31 G X1/4"
1 SYRINGE, EMPTY DISPOSABLE MISCELLANEOUS 2 TIMES DAILY
Qty: 180 SYRINGE | Refills: 3 | Status: SHIPPED | OUTPATIENT
Start: 2018-02-21 | End: 2018-03-20 | Stop reason: ALTCHOICE

## 2018-02-21 NOTE — TELEPHONE ENCOUNTER
[facility] calling regarding prescription regarding needle size that patient requested .  [facility] at [address] [facility]

## 2018-03-09 DIAGNOSIS — E11.9 DIABETES MELLITUS WITHOUT COMPLICATION (HCC): ICD-10-CM

## 2018-03-09 RX ORDER — METFORMIN HYDROCHLORIDE 500 MG/1
TABLET, EXTENDED RELEASE ORAL
Qty: 90 TAB | Refills: 1 | Status: SHIPPED | OUTPATIENT
Start: 2018-03-09 | End: 2018-09-02 | Stop reason: SDUPTHER

## 2018-03-12 RX ORDER — ATORVASTATIN CALCIUM 80 MG/1
TABLET, FILM COATED ORAL
Qty: 90 TAB | Refills: 3 | Status: SHIPPED | OUTPATIENT
Start: 2018-03-12 | End: 2019-03-03 | Stop reason: SDUPTHER

## 2018-03-20 ENCOUNTER — OFFICE VISIT (OUTPATIENT)
Dept: INTERNAL MEDICINE CLINIC | Age: 78
End: 2018-03-20

## 2018-03-20 ENCOUNTER — HOSPITAL ENCOUNTER (OUTPATIENT)
Dept: LAB | Age: 78
Discharge: HOME | End: 2018-03-20
Payer: MEDICARE

## 2018-03-20 ENCOUNTER — TELEPHONE (OUTPATIENT)
Dept: INTERNAL MEDICINE CLINIC | Age: 78
End: 2018-03-20

## 2018-03-20 VITALS
RESPIRATION RATE: 20 BRPM | TEMPERATURE: 98.2 F | HEART RATE: 70 BPM | HEIGHT: 60 IN | BODY MASS INDEX: 37.11 KG/M2 | OXYGEN SATURATION: 97 % | DIASTOLIC BLOOD PRESSURE: 75 MMHG | SYSTOLIC BLOOD PRESSURE: 160 MMHG | WEIGHT: 189 LBS

## 2018-03-20 DIAGNOSIS — Z00.00 MEDICARE ANNUAL WELLNESS VISIT, SUBSEQUENT: Primary | ICD-10-CM

## 2018-03-20 DIAGNOSIS — E11.9 DIABETES MELLITUS WITHOUT COMPLICATION (HCC): ICD-10-CM

## 2018-03-20 DIAGNOSIS — E78.00 PURE HYPERCHOLESTEROLEMIA: ICD-10-CM

## 2018-03-20 PROCEDURE — 85025 COMPLETE CBC W/AUTO DIFF WBC: CPT

## 2018-03-20 PROCEDURE — 80061 LIPID PANEL: CPT

## 2018-03-20 PROCEDURE — 36415 COLL VENOUS BLD VENIPUNCTURE: CPT

## 2018-03-20 PROCEDURE — 80053 COMPREHEN METABOLIC PANEL: CPT

## 2018-03-20 PROCEDURE — 83036 HEMOGLOBIN GLYCOSYLATED A1C: CPT

## 2018-03-20 PROCEDURE — 82043 UR ALBUMIN QUANTITATIVE: CPT

## 2018-03-20 NOTE — MR AVS SNAPSHOT
[address] [place] [address] [address] 
[phone] Patient: Sarkis Patient MRN:  [id number] Visit Information Date & Time Provider Department Dept. Phone Encounter #  
 3/20/2018  9:50 AM Robyn Fraire PA-C [facility] [phone] [id number] Your Appointments 6/20/2018  8:30 AM  
ROUTINE CARE with Jordon Chamorro PA-C [facility] ([facility]) Appt Note: R F/U  
 [place] [address] [facility] [address]  
[facility] [address] [address] Upcoming Health Maintenance Date Due  
 EYE EXAM RETINAL OR DILATED Q1 1/13/2018 MICROALBUMIN Q1 3/3/2018 MEDICARE YEARLY EXAM 3/14/2018 HEMOGLOBIN A1C Q6M 3/21/2018 LIPID PANEL Q1 9/21/2018 GLAUCOMA SCREENING Q2Y 1/13/2019 COLONOSCOPY 1/31/2022 DTaP/Tdap/Td series (2 - Td) 7/21/2027 Allergies as of 3/20/2018  In Progress On: 3/20/2018 By: Isaac Holden LPN Severity Noted Reaction Type Reactions Ace Inhibitors  11/06/2012    Nausea Only Glucophage [Metformin]  04/12/2010    Other (comments) CURRENTLY TAKES METFORMIN Current Immunizations  Reviewed on 5/17/2017 Name Date Influenza High Dose Vaccine PF 9/22/2017, 9/13/2016, 9/7/2015 Influenza Vaccine 9/24/2014, 9/12/2013 Influenza Vaccine Split 10/24/2012 Pneumococcal Conjugate (PCV-13) 9/7/2015 Pneumococcal Polysaccharide (PPSV-23) 9/22/2017 Pneumococcal Vaccine (Unspecified Type) 11/6/2009 Tdap 7/21/2017 ZZZ-RETIRED (DO NOT USE) Pneumococcal Vaccine (Unspecified Type) 6/8/2009 Zoster Vaccine, Live 10/1/2013 Not reviewed this visit You Were Diagnosed With   
  
 Codes Comments Diabetes mellitus without complication ([facility]    -  Primary ICD-10-CM: E11.9 ICD-9-CM: 250.00 Pure hypercholesterolemia     ICD-10-CM: E78.00 ICD-9-CM: 272.0 Vitals BP Pulse Temp Resp Height(growth percentile) Weight(growth percentile) 160/75 70 98.2 °F (36.8 °C) (Oral) 20 5' (1.524 m) 189 lb (85.7 kg) SpO2 BMI OB Status Smoking Status 97% 36.91 kg/m2 Hysterectomy Never Smoker Vitals History BMI and BSA Data Body Mass Index Body Surface Area  
 36.91 kg/m 2 1.9 m 2 Preferred Pharmacy Pharmacy Name Phone [id number] [address] [phone] Your Updated Medication List  
  
   
This list is accurate as of 3/20/18 10:27 AM.  Always use your most recent med list.  
  
  
  
  
 atorvastatin 80 mg tablet Commonly known as:  LIPITOR  
TAKE 1 TABLET BY MOUTH EVERY EVENING  
  
 CENTRUM SILVER PO Take  by mouth. Takes one po daily * Insulin Syringe-Needle U-100 1/2 mL 30 gauge Syrg USE AS DIRECTED TWICE DAILY * BD INSULIN SYRINGE ULTRA-FINE 1/2 mL 31 gauge x 15/64\" Syrg Generic drug:  insulin syringe-needle U-100  
USE AS DIRECTED TWICE A DAY  
  
 metFORMIN  mg tablet Commonly known as:  GLUCOPHAGE XR  
TAKE 1 TABLET BY MOUTH DAILY WITH DINNER NovoLIN 70/30 U-100 Insulin 100 unit/mL (70-30) injection Generic drug:  insulin NPH/insulin regular INJECT 40 UNITS UNDER THE SKIN EVERY MORNING AND 20 UNITS EVERY EVENING  
  
 VITAMIN D3 PO Take 1 Cap by mouth daily. * Notice: This list has 2 medication(s) that are the same as other medications prescribed for you. Read the directions carefully, and ask your doctor or other care provider to review them with you. We Performed the Following CBC WITH AUTOMATED DIFF [76127 CPT(R)] HEMOGLOBIN A1C WITH EAG [89294 CPT(R)] LIPID PANEL [60767 CPT(R)] METABOLIC PANEL, COMPREHENSIVE [53995 CPT(R)] MICROALBUMIN, UR, RAND W/ MICROALB/CREAT RATIO [id number] CPT(R)] Patient Instructions Medicare Wellness Visit, Female The best way to live healthy is to have a healthy lifestyle by eating a well-balanced diet, exercising regularly, limiting alcohol and stopping smoking. Regular physical exams and screening tests are another way to keep healthy. Preventive exams provided by your health care provider can find health problems before they become diseases or illnesses. Preventive services including immunizations, screening tests, monitoring and exams can help you take care of your own health. All people over age 72 should have a pneumovax  and and a prevnar shot to prevent pneumonia. These are once in a lifetime unless you and your provider decide differently. All people over 65 should have a yearly flu shot and a tetanus vaccine every 10 years. A bone mass density to screen for osteoporosis or thinning of the bones should be done every 2 years after 65. Screening for diabetes mellitus with a blood sugar test should be done every year. Glaucoma is a disease of the eye due to increased ocular pressure that can lead to blindness and it should be done every year by an eye professional. 
 
Cardiovascular screening tests that check for elevated lipids (fatty part of blood) which can lead to heart disease and strokes should be done every 5 years. Colorectal screening that evaluates for blood or polyps in your colon should be done yearly as a stool test or every five years as a flexible sigmoidoscope or every 10 years as a colonoscopy up to age 76. Breast cancer screening with a mammogram is recommended biennially  for women age 54-69. Screening for cervical cancer with a pap smear and pelvic exam is recommended for women after age 72 years every 2 years up to age 79 or when the provider and patient decide to stop. If there is a history of cervical abnormalities or other increased risk for cancer then the test is recommended yearly. Hepatitis C screening is also recommended for anyone born between [birth year] through [address]. A shingles vaccine is also recommended once in a lifetime after age 61. Your Medicare Wellness Exam is recommended annually. Here is a list of your current Health Maintenance items with a due date: 
Health Maintenance Due Topic Date Due [facility] Eye Exam  01/13/2018  Albumin Urine Test  03/03/2018 [facility] Annual Well Visit  03/14/2018  Hemoglobin A1C    03/21/2018 Please provide this summary of care documentation to your next provider. Your primary care clinician is listed as [phone] [address] [place] [address] [address] If you have any questions after today's visit, please call [phone].

## 2018-03-20 NOTE — PROGRESS NOTES
This is the Subsequent Medicare Annual Wellness Exam, performed 12 months or more after the Initial AWV or the last Subsequent AWV    I have reviewed the patient's medical history in detail and updated the computerized patient record. History     Past Medical History:   Diagnosis Date    Diabetes ([facility]     Fatty liver     GERD (gastroesophageal reflux disease)     Hypercholesterolemia     Osteopenia 2010    White coat hypertension       Past Surgical History:   Procedure Laterality Date    HX CATARACT REMOVAL Bilateral 8/2012    HX COLONOSCOPY  2011    HX NA AND BSO  1987     Current Outpatient Prescriptions   Medication Sig Dispense Refill    BD INSULIN SYRINGE ULTRA-FINE 1/2 mL 31 gauge x 15/64\" syrg USE AS DIRECTED TWICE A DAY  3    CHOLECALCIFEROL, VITAMIN D3, (VITAMIN D3 PO) Take 1 Cap by mouth daily.  atorvastatin (LIPITOR) 80 mg tablet TAKE 1 TABLET BY MOUTH EVERY EVENING 90 Tab 3    metFORMIN ER (GLUCOPHAGE XR) 500 mg tablet TAKE 1 TABLET BY MOUTH DAILY WITH DINNER 90 Tab 1    Insulin Syringe-Needle U-100 1/2 mL 30 gauge syrg USE AS DIRECTED TWICE DAILY 180 Syringe 3    NOVOLIN 70/30 100 unit/mL (70-30) injection INJECT 40 UNITS UNDER THE SKIN EVERY MORNING AND 20 UNITS EVERY EVENING 20 mL 11    MULTIVITAMIN W-MINERALS/LUTEIN (CENTRUM SILVER PO) Take  by mouth.  Takes one po daily        Allergies   Allergen Reactions    Ace Inhibitors Nausea Only    Glucophage [Metformin] Other (comments)     CURRENTLY TAKES METFORMIN     Family History   Problem Relation Age of Onset    Diabetes Sister     Other Sister      arthritis    Heart Disease Mother     Diabetes Mother     Stroke Father      Social History   Substance Use Topics    Smoking status: Never Smoker    Smokeless tobacco: Never Used    Alcohol use No     Patient Active Problem List   Diagnosis Code    Fatty liver K76.0    Diabetes mellitus without complication ([facility] M06.2    Osteopenia M85.80    Pure hypercholesterolemia E78.00    White coat syndrome without hypertension R03.0    Living will, counseling/discussion Z71.89       Depression Risk Factor Screening:     PHQ over the last two weeks 9/21/2017   Little interest or pleasure in doing things Not at all   Feeling down, depressed or hopeless Not at all   Total Score PHQ 2 0     Alcohol Risk Factor Screening:   Drink small amount of red wine nightly. Functional Ability and Level of Safety:   Hearing Loss  Hearing is good. Activities of Daily Living  The home contains: no safety equipment. patient does have a taller toilet. Patient does total self care    Fall Risk  Fall Risk Assessment, last 12 mths 9/21/2017   Able to walk? Yes   Fall in past 12 months? No   Fall with injury? -   Number of falls in past 12 months -   Fall Risk Score -       Abuse Screen  Patient is not abused    Cognitive Screening   Evaluation of Cognitive Function:  Has your family/caregiver stated any concerns about your memory: no  Normal    Patient Care Team   Patient Care Team:  Violetta Emery MD as PCP - General  Issa Garibay MD (Ophthalmology)  Dr. 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 She also has diabetes and hyperlipidemia. Diabetic Review of Systems - medication compliance: compliant all of the time, diabetic diet compliance: noncompliant some of the time. Other symptoms and concerns: need routine labs. She states she has not been exercising a lot. He hope to get more active when the weather get warmer.  She states since her last visit she has not been eating the best. She reports she saw her eye doctor last week and will require a procedure. This will be done in the office. Patient Active Problem List    Diagnosis Date Noted    Living will, counseling/discussion 06/16/2017    White coat syndrome without hypertension 05/17/2017    Osteopenia 02/15/2017    Pure hypercholesterolemia 02/15/2017    Diabetes mellitus without complication ([facility] [facility] 72/20/8125    Fatty liver 11/06/2013     Allergies   Allergen Reactions    Ace Inhibitors Nausea Only    Glucophage [Metformin] Other (comments)     CURRENTLY TAKES METFORMIN             Review of Systems  Pertinent items are noted in HPI. Objective:     Visit Vitals    /75    Pulse 70    Temp 98.2 °F (36.8 °C) (Oral)    Resp 20    Ht 5' (1.524 m)    Wt 189 lb (85.7 kg)    SpO2 97%    BMI 36.91 kg/m2     Appearance: alert, well appearing, and in no distress. Exam: heart sounds normal rate and regular rhythm, chest clear  Lab review: orders written for new lab studies as appropriate; see orders. Assessment/Plan:     diabetes waiting on labs, hyperlipidemia waiting on labs. Diabetic issues reviewed with her: low cholesterol diet, weight control and daily exercise discussed. Diagnoses and all orders for this visit:    1. Medicare annual wellness visit, subsequent    2. Diabetes mellitus without complication ([facility]  -     CBC WITH AUTOMATED DIFF  -     METABOLIC PANEL, COMPREHENSIVE  -     MICROALBUMIN, UR, RAND W/ MICROALB/CREAT RATIO  -     HEMOGLOBIN A1C WITH EAG    3. Pure hypercholesterolemia  -     CBC WITH AUTOMATED DIFF  -     METABOLIC PANEL, COMPREHENSIVE  -     LIPID PANEL    patient will be contact with her labs. She will follow up in 3 months for routine check up. All this discussed with the patient and she understands and agrees.

## 2018-03-20 NOTE — TELEPHONE ENCOUNTER
Please contact Dr. Renée Saucedo office and get her most recent eye exam. She states it was last Thursday.  [phone]

## 2018-03-20 NOTE — PATIENT INSTRUCTIONS

## 2018-03-21 LAB
ALBUMIN SERPL-MCNC: 4.4 G/DL (ref 3.5–4.8)
ALBUMIN/CREAT UR: 17.3 MG/G CREAT (ref 0–30)
ALBUMIN/GLOB SERPL: 1.7 {RATIO} (ref 1.2–2.2)
ALP SERPL-CCNC: 100 IU/L (ref 39–117)
ALT SERPL-CCNC: 20 IU/L (ref 0–32)
AST SERPL-CCNC: 21 IU/L (ref 0–40)
BASOPHILS # BLD AUTO: 0 X10E3/UL (ref 0–0.2)
BASOPHILS NFR BLD AUTO: 0 %
BILIRUB SERPL-MCNC: 0.6 MG/DL (ref 0–1.2)
BUN SERPL-MCNC: 16 MG/DL (ref 8–27)
BUN/CREAT SERPL: 18 (ref 12–28)
CALCIUM SERPL-MCNC: 9.8 MG/DL (ref 8.7–10.3)
CHLORIDE SERPL-SCNC: 103 MMOL/L (ref 96–106)
CHOLEST SERPL-MCNC: 163 MG/DL (ref 100–199)
CO2 SERPL-SCNC: 25 MMOL/L (ref 18–29)
CREAT SERPL-MCNC: 0.89 MG/DL (ref 0.57–1)
CREAT UR-MCNC: 69.9 MG/DL
EOSINOPHIL # BLD AUTO: 0.1 X10E3/UL (ref 0–0.4)
EOSINOPHIL NFR BLD AUTO: 1 %
ERYTHROCYTE [DISTWIDTH] IN BLOOD BY AUTOMATED COUNT: 13.1 % (ref 12.3–15.4)
EST. AVERAGE GLUCOSE BLD GHB EST-MCNC: 137 MG/DL
GFR SERPLBLD CREATININE-BSD FMLA CKD-EPI: 64 ML/MIN/1.73
GFR SERPLBLD CREATININE-BSD FMLA CKD-EPI: 73 ML/MIN/1.73
GLOBULIN SER CALC-MCNC: 2.6 G/DL (ref 1.5–4.5)
GLUCOSE SERPL-MCNC: 140 MG/DL (ref 65–99)
HBA1C MFR BLD: 6.4 % (ref 4.8–5.6)
HCT VFR BLD AUTO: 42.7 % (ref 34–46.6)
HDLC SERPL-MCNC: 45 MG/DL
HGB BLD-MCNC: 13.9 G/DL (ref 11.1–15.9)
IMM GRANULOCYTES # BLD: 0 X10E3/UL (ref 0–0.1)
IMM GRANULOCYTES NFR BLD: 0 %
INTERPRETATION, 910389: NORMAL
LDLC SERPL CALC-MCNC: 70 MG/DL (ref 0–99)
LYMPHOCYTES # BLD AUTO: 2.2 X10E3/UL (ref 0.7–3.1)
LYMPHOCYTES NFR BLD AUTO: 35 %
Lab: NORMAL
MCH RBC QN AUTO: 29 PG (ref 26.6–33)
MCHC RBC AUTO-ENTMCNC: 32.6 G/DL (ref 31.5–35.7)
MCV RBC AUTO: 89 FL (ref 79–97)
MICROALBUMIN UR-MCNC: 12.1 UG/ML
MONOCYTES # BLD AUTO: 0.3 X10E3/UL (ref 0.1–0.9)
MONOCYTES NFR BLD AUTO: 5 %
NEUTROPHILS # BLD AUTO: 3.8 X10E3/UL (ref 1.4–7)
NEUTROPHILS NFR BLD AUTO: 59 %
PLATELET # BLD AUTO: 158 X10E3/UL (ref 150–379)
POTASSIUM SERPL-SCNC: 4.5 MMOL/L (ref 3.5–5.2)
PROT SERPL-MCNC: 7 G/DL (ref 6–8.5)
RBC # BLD AUTO: 4.8 X10E6/UL (ref 3.77–5.28)
SODIUM SERPL-SCNC: 145 MMOL/L (ref 134–144)
TRIGL SERPL-MCNC: 239 MG/DL (ref 0–149)
VLDLC SERPL CALC-MCNC: 48 MG/DL (ref 5–40)
WBC # BLD AUTO: 6.4 X10E3/UL (ref 3.4–10.8)

## 2018-03-22 NOTE — PROGRESS NOTES
Writer contacted patient to inform of lab results and instruction per Kevin Calderón, patient verbalized understanding.

## 2018-03-22 NOTE — PROGRESS NOTES
Please let the patient know her triglycerides are more elevated this time. 239 compared to 170. Advise her to go back watching her diet and we will recheck at next visit. All other labs stable.  thanks

## 2018-03-29 RX ORDER — HUMAN INSULIN 100 [IU]/ML
INJECTION, SUSPENSION SUBCUTANEOUS
Qty: 20 ML | Refills: 1 | Status: SHIPPED | OUTPATIENT
Start: 2018-03-29 | End: 2018-05-23 | Stop reason: SDUPTHER

## 2018-05-23 RX ORDER — HUMAN INSULIN 100 [IU]/ML
INJECTION, SUSPENSION SUBCUTANEOUS
Qty: 20 ML | Refills: 1 | Status: SHIPPED | OUTPATIENT
Start: 2018-05-23 | End: 2018-07-22 | Stop reason: SDUPTHER

## 2018-07-19 ENCOUNTER — OFFICE VISIT (OUTPATIENT)
Dept: INTERNAL MEDICINE CLINIC | Age: 78
End: 2018-07-19

## 2018-07-19 VITALS
SYSTOLIC BLOOD PRESSURE: 165 MMHG | WEIGHT: 189 LBS | HEIGHT: 60 IN | DIASTOLIC BLOOD PRESSURE: 83 MMHG | OXYGEN SATURATION: 97 % | BODY MASS INDEX: 37.11 KG/M2 | TEMPERATURE: 97.8 F | RESPIRATION RATE: 18 BRPM | HEART RATE: 73 BPM

## 2018-07-19 DIAGNOSIS — I10 HYPERTENSION, UNSPECIFIED TYPE: ICD-10-CM

## 2018-07-19 DIAGNOSIS — F43.9 STRESS: Primary | ICD-10-CM

## 2018-07-19 RX ORDER — LOSARTAN POTASSIUM 50 MG/1
50 TABLET ORAL DAILY
Qty: 30 TAB | Refills: 1 | Status: SHIPPED | OUTPATIENT
Start: 2018-07-19 | End: 2018-08-30 | Stop reason: DRUGHIGH

## 2018-07-19 NOTE — PROGRESS NOTES
1. Have you been to the ER, urgent care clinic since your last visit? Hospitalized since your last visit?no    2. Have you seen or consulted any other health care providers outside of the [address] [place] [address] since your last visit? Include any pap smears or colon screening.  Yes    Chief Complaint   Patient presents with    Hypertension     follow up    Cholesterol Problem     follow up    Diabetes     follow up     Fasting

## 2018-07-19 NOTE — PROGRESS NOTES
HISTORY OF PRESENT ILLNESS  Scar Mena is a 78 y.o. female. Chief Complaint   Patient presents with    Hypertension     follow up    Cholesterol Problem     follow up    Diabetes     follow up     Health Maintenance Due   Topic Date Due    EYE [address] Q1  01/13/2018     HPI  DM II - Controlled at last check in March:   Lab Results   Component Value Date/Time    Hemoglobin A1c 6.4 (H) 03/20/2018 10:36 AM    Hemoglobin A1c (POC) 8.0 08/04/2015 09:44 AM   LDL controlled at last check in March:   Lab Results   Component Value Date/Time    Cholesterol, total 163 03/20/2018 10:36 AM    HDL Cholesterol 45 03/20/2018 10:36 AM    LDL, calculated 70 03/20/2018 10:36 AM    VLDL, calculated 48 (H) 03/20/2018 10:36 AM    Triglyceride 239 (H) 03/20/2018 10:36 AM    CHOL/HDL Ratio 4.1 09/08/2010 10:15 AM     Lab Results   Component Value Date/Time    Microalb/Creat ratio (ug/mg creat.) 17.3 03/20/2018 10:36 AM     Wt Readings from Last 3 Encounters:   07/19/18 189 lb (85.7 kg)   03/20/18 189 lb (85.7 kg)   09/21/17 183 lb (83 kg)   Weight is increasing slowly. Pt notes she plans to work on diet & exercise to help with this. HTN -   Uncontrolled:  BP Readings from Last 3 Encounters:   07/19/18 165/83   03/20/18 160/75   09/21/17 158/74     Marital stress - pt and  are fighting verbally frequently. No physical abuse. ACP - currently  is pt's decision maker. Pt notes she may want to change this. Form given. Advance Care Planning (ACP) Provider Note - Comprehensive     Date of ACP Conversation: 07/19/18  Persons included in Conversation:  patient  Length of ACP Conversation in minutes:  <16 minutes (Non-Billable)    Authorized Decision Maker (if patient is incapable of making informed decisions):    This person is:  Healthcare Agent/Medical Power of  under Advance Directive          General ACP for ALL Patients with Decision Making Capacity:   Importance of advance care planning, including choosing a healthcare agent to communicate patient's healthcare decisions if patient lost the ability to make decisions, such as after a sudden illness or accident  Understanding of the healthcare agent role was assessed and information provided    Review of Existing Advance Directive:  Does this advance directive still reflect your preferences? No: Pt is considering changing her POA, but does not yet know who she will choose. Form given today. (Provide new form/Refer for assistance in updating)    For Serious or Chronic Illness:  Understanding of medical condition      Interventions Provided:  Recommended completion of Advance Directive form after review of ACP materials and conversation with prospective healthcare agent   Recommended communicating the plan and making copies for the healthcare agent, personal physician, and others as appropriate (e.g., health system)  Recommended review of completed ACP document annually or upon change in health status      Review of Systems   Respiratory: Negative for shortness of breath. Cardiovascular: Negative for chest pain and palpitations. Neurological: Negative for dizziness, loss of consciousness and weakness. Physical Exam   Constitutional: She is oriented to person, place, and time. She appears well-developed and well-nourished. No distress. HENT:   Head: Normocephalic and atraumatic. Neck: Neck supple. No JVD present. Cardiovascular: Normal rate, regular rhythm and normal heart sounds. Pulmonary/Chest: Effort normal and breath sounds normal. No respiratory distress. Musculoskeletal: She exhibits no edema. Neurological: She is alert and oriented to person, place, and time. Skin: Skin is warm and dry. Psychiatric: She has a normal mood and affect. Her behavior is normal. Judgment and thought content normal.   Nursing note and vitals reviewed. ASSESSMENT and PLAN    ICD-10-CM ICD-9-CM    1.  Stress F43.9 V62.89 REFERRAL TO SOCIAL WORK  Pt declines medication at this time. 2. Hypertension, unspecified type I10 401.9 Start losartan (COZAAR) 50 mg tablet   ACP info reviewed today.

## 2018-07-24 RX ORDER — HUMAN INSULIN 100 [IU]/ML
INJECTION, SUSPENSION SUBCUTANEOUS
Qty: 20 ML | Refills: 1 | Status: SHIPPED | OUTPATIENT
Start: 2018-07-24 | End: 2018-09-23 | Stop reason: SDUPTHER

## 2018-08-30 ENCOUNTER — OFFICE VISIT (OUTPATIENT)
Dept: INTERNAL MEDICINE CLINIC | Age: 78
End: 2018-08-30

## 2018-08-30 VITALS
WEIGHT: 187.6 LBS | TEMPERATURE: 97.6 F | OXYGEN SATURATION: 97 % | HEART RATE: 74 BPM | DIASTOLIC BLOOD PRESSURE: 79 MMHG | RESPIRATION RATE: 18 BRPM | BODY MASS INDEX: 31.25 KG/M2 | HEIGHT: 65 IN | SYSTOLIC BLOOD PRESSURE: 147 MMHG

## 2018-08-30 DIAGNOSIS — I10 HYPERTENSION, UNSPECIFIED TYPE: ICD-10-CM

## 2018-08-30 DIAGNOSIS — E11.9 DIABETES MELLITUS WITHOUT COMPLICATION (HCC): Primary | ICD-10-CM

## 2018-08-30 RX ORDER — LOSARTAN POTASSIUM 100 MG/1
100 TABLET ORAL DAILY
Qty: 30 TAB | Refills: 5 | Status: SHIPPED | OUTPATIENT
Start: 2018-08-30 | End: 2018-09-04 | Stop reason: SDUPTHER

## 2018-08-30 NOTE — PROGRESS NOTES
HISTORY OF PRESENT ILLNESS Luiz Anderson is a 78 y.o. female. Chief Complaint Patient presents with  Hypertension  
  follow up  Cholesterol Problem  
  follow up  Diabetes  
  follow up Health Maintenance Due Topic Date Due  
 EYE EXAM RETINAL OR DILATED Q1  01/13/2018  Influenza Age 5 to Adult  08/01/2018  HEMOGLOBIN A1C Q6M  09/20/2018 HPI 
HTN - started losartan 50 mg last month. No s/e. BP remains uncontrolled BP Readings from Last 3 Encounters:  
08/30/18 147/79  
07/19/18 165/83  
03/20/18 160/75 DM II - due for labs next month. Lab Results Component Value Date/Time Hemoglobin A1c 6.4 (H) 03/20/2018 10:36 AM  
 Hemoglobin A1c (POC) 8.0 08/04/2015 09:44 AM  
 
Lab Results Component Value Date/Time Cholesterol, total 163 03/20/2018 10:36 AM  
 HDL Cholesterol 45 03/20/2018 10:36 AM  
 LDL, calculated 70 03/20/2018 10:36 AM  
 VLDL, calculated 48 (H) 03/20/2018 10:36 AM  
 Triglyceride 239 (H) 03/20/2018 10:36 AM  
 CHOL/HDL Ratio 4.1 09/08/2010 10:15 AM  
 
Lab Results Component Value Date/Time Microalb/Creat ratio (ug/mg creat.) 17.3 03/20/2018 10:36 AM  
 
Wt Readings from Last 3 Encounters:  
08/30/18 187 lb 9.6 oz (85.1 kg) 07/19/18 189 lb (85.7 kg) 03/20/18 189 lb (85.7 kg) Pt has lost weight since last visit! Trying to avoid late night snacking. Review of Systems Respiratory: Negative for shortness of breath. Cardiovascular: Negative for chest pain and palpitations. Neurological: Negative for dizziness, loss of consciousness and weakness. Physical Exam  
Constitutional: She is oriented to person, place, and time. She appears well-developed and well-nourished. No distress. HENT:  
Head: Normocephalic and atraumatic. Neck: Neck supple. No JVD present. Cardiovascular: Normal rate, regular rhythm and normal heart sounds. Pulmonary/Chest: Effort normal and breath sounds normal. No respiratory distress. Musculoskeletal: She exhibits no edema. Neurological: She is alert and oriented to person, place, and time. Skin: Skin is warm and dry. Psychiatric: She has a normal mood and affect. Her behavior is normal. Judgment and thought content normal.  
Nursing note and vitals reviewed. ASSESSMENT and PLAN Diagnoses and all orders for this visit: 1. Diabetes mellitus without complication ([facility] [address] -     METABOLIC PANEL, COMPREHENSIVE 
-     HEMOGLOBIN A1C W/O EAG 
-     LIPID PANEL 2. Hypertension, unspecified type -     Increase dose to: losartan (COZAAR) 100 mg tablet; Take 1 Tab by mouth daily.

## 2018-08-30 NOTE — PROGRESS NOTES
1. Have you been to the ER, urgent care clinic since your last visit? Hospitalized since your last visit?no 2. Have you seen or consulted any other health care providers outside of the [address] [place] [address] since your last visit? Include any pap smears or colon screening. No 
Chief Complaint Patient presents with  Hypertension  
  follow up  Cholesterol Problem  
  follow up  Diabetes  
  follow up Fasting PHQ over the last two weeks 8/30/2018 Little interest or pleasure in doing things Not at all Feeling down, depressed, irritable, or hopeless Not at all Total Score PHQ 2 0 Abuse Screening Questionnaire 7/19/2018 Do you ever feel afraid of your partner? Katharine Georges Are you in a relationship with someone who physically or mentally threatens you? Katharine Georges Is it safe for you to go home? German Novak Fall Risk Assessment, last 12 mths 8/30/2018 Able to walk? Yes Fall in past 12 months? No  
Fall with injury? -  
Number of falls in past 12 months - Fall Risk Score -

## 2018-09-02 DIAGNOSIS — E11.9 DIABETES MELLITUS WITHOUT COMPLICATION (HCC): ICD-10-CM

## 2018-09-04 ENCOUNTER — TELEPHONE (OUTPATIENT)
Dept: INTERNAL MEDICINE CLINIC | Age: 78
End: 2018-09-04

## 2018-09-04 DIAGNOSIS — I10 HYPERTENSION, UNSPECIFIED TYPE: ICD-10-CM

## 2018-09-04 RX ORDER — LOSARTAN POTASSIUM 100 MG/1
100 TABLET ORAL DAILY
Qty: 90 TAB | Refills: 3 | Status: SHIPPED | OUTPATIENT
Start: 2018-09-04 | End: 2018-09-04 | Stop reason: SDUPTHER

## 2018-09-04 RX ORDER — LOSARTAN POTASSIUM 50 MG/1
50 TABLET ORAL DAILY
Qty: 90 TAB | Refills: 3 | Status: SHIPPED | OUTPATIENT
Start: 2018-09-04 | End: 2018-11-09

## 2018-09-04 RX ORDER — METFORMIN HYDROCHLORIDE 500 MG/1
TABLET, EXTENDED RELEASE ORAL
Qty: 90 TAB | Refills: 1 | Status: SHIPPED | OUTPATIENT
Start: 2018-09-04 | End: 2019-03-03 | Stop reason: SDUPTHER

## 2018-09-04 NOTE — TELEPHONE ENCOUNTER
Given recent BPs in office:   BP Readings from Last 3 Encounters:   08/30/18 147/79   07/19/18 165/83   03/20/18 160/75     I don't think it's safe for her to go without any Losartan, so yes, let's go back to the 50 mg. I will send it in now. Please inform her.

## 2018-09-04 NOTE — TELEPHONE ENCOUNTER
Spoke with patient. States that blood pressure on 9/3/18 was 105/54 , 9/4/18 98/52 patient states did not take blood pressure medication. Today blood pressure is 134/66. patient is requesting to know if she should stop taking the 100mg of blood pressure medication and go back to 50 mg.

## 2018-09-04 NOTE — TELEPHONE ENCOUNTER
Spoke with patient. Advised per Jordy Jennings PA-C to stop taking the 100 mg and remain taking the 50 mg.  Patient verbalized understanding

## 2018-09-20 ENCOUNTER — HOSPITAL ENCOUNTER (OUTPATIENT)
Dept: LAB | Age: 78
Discharge: HOME | End: 2018-09-20
Payer: MEDICARE

## 2018-09-20 PROCEDURE — 80053 COMPREHEN METABOLIC PANEL: CPT

## 2018-09-20 PROCEDURE — 80061 LIPID PANEL: CPT

## 2018-09-20 PROCEDURE — 83036 HEMOGLOBIN GLYCOSYLATED A1C: CPT

## 2018-09-20 PROCEDURE — 36415 COLL VENOUS BLD VENIPUNCTURE: CPT

## 2018-09-21 LAB
ALBUMIN SERPL-MCNC: 4.3 G/DL (ref 3.5–4.8)
ALBUMIN/GLOB SERPL: 1.6 {RATIO} (ref 1.2–2.2)
ALP SERPL-CCNC: 103 IU/L (ref 39–117)
ALT SERPL-CCNC: 11 IU/L (ref 0–32)
AST SERPL-CCNC: 16 IU/L (ref 0–40)
BILIRUB SERPL-MCNC: 0.8 MG/DL (ref 0–1.2)
BUN SERPL-MCNC: 17 MG/DL (ref 8–27)
BUN/CREAT SERPL: 18 (ref 12–28)
CALCIUM SERPL-MCNC: 9.3 MG/DL (ref 8.7–10.3)
CHLORIDE SERPL-SCNC: 104 MMOL/L (ref 96–106)
CHOLEST SERPL-MCNC: 155 MG/DL (ref 100–199)
CO2 SERPL-SCNC: 23 MMOL/L (ref 20–29)
CREAT SERPL-MCNC: 0.93 MG/DL (ref 0.57–1)
GLOBULIN SER CALC-MCNC: 2.7 G/DL (ref 1.5–4.5)
GLUCOSE SERPL-MCNC: 143 MG/DL (ref 65–99)
HBA1C MFR BLD: 6.4 % (ref 4.8–5.6)
HDLC SERPL-MCNC: 45 MG/DL
INTERPRETATION, 910389: NORMAL
LDLC SERPL CALC-MCNC: 73 MG/DL (ref 0–99)
Lab: NORMAL
POTASSIUM SERPL-SCNC: 4.4 MMOL/L (ref 3.5–5.2)
PROT SERPL-MCNC: 7 G/DL (ref 6–8.5)
SODIUM SERPL-SCNC: 139 MMOL/L (ref 134–144)
TRIGL SERPL-MCNC: 185 MG/DL (ref 0–149)
VLDLC SERPL CALC-MCNC: 37 MG/DL (ref 5–40)

## 2018-09-24 RX ORDER — HUMAN INSULIN 100 [IU]/ML
INJECTION, SUSPENSION SUBCUTANEOUS
Qty: 20 ML | Refills: 1 | Status: SHIPPED | OUTPATIENT
Start: 2018-09-24 | End: 2018-11-24 | Stop reason: SDUPTHER

## 2018-09-26 ENCOUNTER — TELEPHONE (OUTPATIENT)
Dept: INTERNAL MEDICINE CLINIC | Age: 78
End: 2018-09-26

## 2018-09-26 NOTE — TELEPHONE ENCOUNTER
Spoke with patient . States that she has not been taking her blood pressure medication due to her blood pressures has been running low. Scheduled an appointment to follow up on labs and blood pressure.

## 2018-09-28 ENCOUNTER — OFFICE VISIT (OUTPATIENT)
Dept: INTERNAL MEDICINE CLINIC | Age: 78
End: 2018-09-28

## 2018-09-28 VITALS
DIASTOLIC BLOOD PRESSURE: 76 MMHG | BODY MASS INDEX: 31.49 KG/M2 | SYSTOLIC BLOOD PRESSURE: 177 MMHG | HEIGHT: 65 IN | WEIGHT: 189 LBS | TEMPERATURE: 97.9 F | HEART RATE: 74 BPM | OXYGEN SATURATION: 97 % | RESPIRATION RATE: 18 BRPM

## 2018-09-28 DIAGNOSIS — I10 HYPERTENSION, UNSPECIFIED TYPE: Primary | ICD-10-CM

## 2018-09-28 DIAGNOSIS — Z23 ENCOUNTER FOR IMMUNIZATION: ICD-10-CM

## 2018-09-28 NOTE — PROGRESS NOTES
1. Have you been to the ER, urgent care clinic since your last visit? Hospitalized since your last visit?no      2. Have you seen or consulted any other health care providers outside of the [address] [place] [address] since your last visit? Include any pap smears or colon screening. Yes    Chief Complaint   Patient presents with    Hypertension     follow up on blood pressure     Not fasting  Abuse Screening Questionnaire 7/19/2018   Do you ever feel afraid of your partner? N   Are you in a relationship with someone who physically or mentally threatens you? N   Is it safe for you to go home? Y     PHQ over the last two weeks 9/28/2018   Little interest or pleasure in doing things Not at all   Feeling down, depressed, irritable, or hopeless Not at all   Total Score PHQ 2 0     Fall Risk Assessment, last 12 mths 9/28/2018   Able to walk? Yes   Fall in past 12 months?  No   Fall with injury? -   Number of falls in past 12 months -   Fall Risk Score -

## 2018-09-28 NOTE — PROGRESS NOTES
HISTORY OF PRESENT ILLNESS  Rhys Godinez is a 78 y.o. female. Chief Complaint   Patient presents with    Hypertension     follow up on blood pressure     Health Maintenance Due   Topic Date Due    Shingrix Vaccine Age 50> (1 of 2) 12/19/1992    EYE EXAM RETINAL OR DILATED Q1  01/13/2018    Influenza Age 5 to Adult  08/01/2018     HPI  HTN -   BP uncontrolled:   BP Readings from Last 3 Encounters:   09/28/18 177/76   08/30/18 147/79   07/19/18 165/83     Increased dose of Losartan to 100 mg at last visit but pt never started higher dose because BP at home was normal. Stopped 50 mg on 9/20/18 because BP was 98/60. No s/e. Wt Readings from Last 3 Encounters:   09/28/18 189 lb (85.7 kg)   08/30/18 187 lb 9.6 oz (85.1 kg)   07/19/18 189 lb (85.7 kg)   Pt has gained weight since last visit. Working in the yard for exercise a few times/week. Not currently interested in weight loss resources. Feels that she understands what to do. Review of Systems   Respiratory: Negative for shortness of breath. Cardiovascular: Negative for chest pain and palpitations. Neurological: Negative for dizziness, loss of consciousness and weakness. Physical Exam   Constitutional: She is oriented to person, place, and time. She appears well-developed and well-nourished. No distress. HENT:   Head: Normocephalic and atraumatic. Neck: Neck supple. No JVD present. Cardiovascular: Normal rate, regular rhythm and normal heart sounds. Pulmonary/Chest: Effort normal and breath sounds normal. No respiratory distress. Musculoskeletal: She exhibits no edema. Neurological: She is alert and oriented to person, place, and time. Skin: Skin is warm and dry. Psychiatric: She has a normal mood and affect. Her behavior is normal. Judgment and thought content normal.   Nursing note and vitals reviewed. ASSESSMENT and PLAN  Diagnoses and all orders for this visit:    1.  Hypertension, unspecified type - restart Losartan 50 mg and follow up in 6 weeks.      2. Encounter for immunization  -     Influenza Vaccine Inactivated (IIV) (FLUAD), Subunit, Adjuvanted, IM ([id number])

## 2018-09-28 NOTE — MR AVS SNAPSHOT
[address] [place] [address] Drive [address] [facility] 
[phone] Patient: Fracisco Brown MRN:  [id number] Visit Information Date & Time Provider Department Dept. Phone Encounter #  
 9/28/2018  1:30 PM Rashida Rose PA-C Count includes the [facility] [phone] [id number] Follow-up Instructions Return in about 6 weeks (around 11/9/2018) for HTN follow up. Upcoming Health Maintenance Date Due Shingrix Vaccine Age 50> (1 of 2) 12/19/1992 EYE EXAM RETINAL OR DILATED Q1 1/13/2018 Influenza Age 5 to Adult 8/1/2018 GLAUCOMA SCREENING Q2Y 1/13/2019 HEMOGLOBIN A1C Q6M 3/20/2019 MICROALBUMIN Q1 3/20/2019 MEDICARE YEARLY EXAM 3/21/2019 LIPID PANEL Q1 9/20/2019 COLONOSCOPY 1/31/2022 DTaP/Tdap/Td series (2 - Td) 7/21/2027 Allergies as of 9/28/2018  Review Complete On: 9/28/2018 By: Thao Oliveros Severity Noted Reaction Type Reactions Ace Inhibitors  11/06/2012    Nausea Only Glucophage [Metformin]  04/12/2010    Other (comments) CURRENTLY TAKES METFORMIN Current Immunizations  Reviewed on 5/17/2017 Name Date Influenza High Dose Vaccine PF 9/22/2017, 9/13/2016, 9/7/2015 Influenza Vaccine 9/24/2014, 9/12/2013 Influenza Vaccine (Tri) Adjuvanted  Incomplete Influenza Vaccine Split 10/24/2012 Pneumococcal Conjugate (PCV-13) 9/7/2015 Pneumococcal Polysaccharide (PPSV-23) 9/22/2017 Pneumococcal Vaccine (Unspecified Type) 11/6/2009 Tdap 7/21/2017 ZZZ-RETIRED (DO NOT USE) Pneumococcal Vaccine (Unspecified Type) 6/8/2009 Zoster Vaccine, Live 10/1/2013 Not reviewed this visit You Were Diagnosed With   
  
 Codes Comments Hypertension, unspecified type    -  Primary ICD-10-CM: I10 
ICD-9-CM: 401.9 Encounter for immunization     ICD-10-CM: Z90 ICD-9-CM: V03.89 Vitals BP Pulse Temp Resp Height(growth percentile) Weight(growth percentile) 177/76 (BP 1 Location: Left arm, BP Patient Position: At rest) 74 97.9 °F (36.6 °C) (Oral) 18 5' 5\" (1.651 m) 189 lb (85.7 kg) SpO2 BMI OB Status Smoking Status 97% 31.45 kg/m2 Hysterectomy Never Smoker BMI and BSA Data Body Mass Index Body Surface Area  
 31.45 kg/m 2 1.98 m 2 Preferred Pharmacy Pharmacy Name Phone [id number] [phone] Your Updated Medication List  
  
   
This list is accurate as of 9/28/18  2:21 PM.  Always use your most recent med list.  
  
  
  
  
 atorvastatin 80 mg tablet Commonly known as:  LIPITOR  
TAKE 1 TABLET BY MOUTH EVERY EVENING  
  
 CENTRUM SILVER PO Take  by mouth. Takes one po daily * Insulin Syringe-Needle U-100 1/2 mL 30 gauge Syrg USE AS DIRECTED TWICE DAILY * BD INSULIN SYRINGE ULTRA-FINE 1/2 mL 31 gauge x 15/64\" Syrg Generic drug:  insulin syringe-needle U-100  
USE AS DIRECTED TWICE A DAY  
  
 losartan 50 mg tablet Commonly known as:  COZAAR Take 1 Tab by mouth daily. metFORMIN  mg tablet Commonly known as:  GLUCOPHAGE XR  
TAKE 1 TABLET BY MOUTH DAILY WITH DINNER NovoLIN 70/30 U-100 Insulin 100 unit/mL (70-30) injection Generic drug:  insulin NPH/insulin regular INJECT 40 UNITS UNDER THE SKIN EVERY MORNING AND 20 UNITS EVERY EVENING  
  
 VITAMIN D3 PO Take 1 Cap by mouth daily. * Notice: This list has 2 medication(s) that are the same as other medications prescribed for you. Read the directions carefully, and ask your doctor or other care provider to review them with you. We Performed the Following INFLUENZA VACCINE INACTIVATED (IIV), SUBUNIT, ADJUVANTED, IM [id number] CPT(R)] Follow-up Instructions Return in about 6 weeks (around 11/9/2018) for HTN follow up. Please provide this summary of care documentation to your next provider. Your primary care clinician is listed as Sariah Tabares. If you have any questions after today's visit, please call [phone].

## 2018-09-29 NOTE — ACP (ADVANCE CARE PLANNING)
Advance Care Planning (ACP) Provider Note - Comprehensive      Date of ACP Conversation: 07/19/18  Persons included in Conversation:  patient  Length of ACP Conversation in minutes:  <16 minutes (Non-Billable)     Authorized Decision Maker (if patient is incapable of making informed decisions): This person is:  Healthcare Agent/Medical Power of  under Advance Directive         General ACP for ALL Patients with Decision Making Capacity:   Importance of advance care planning, including choosing a healthcare agent to communicate patient's healthcare decisions if patient lost the ability to make decisions, such as after a sudden illness or accident  Understanding of the healthcare agent role was assessed and information provided     Review of Existing Advance Directive:  Does this advance directive still reflect your preferences? No: Pt is considering changing her POA, but does not yet know who she will choose. Form given today.   (Provide new form/Refer for assistance in updating)     For Serious or Chronic Illness:  Understanding of medical condition       Interventions Provided:  Recommended completion of Advance Directive form after review of ACP materials and conversation with prospective healthcare agent   Recommended communicating the plan and making copies for the healthcare agent, personal physician, and others as appropriate (e.g., health system)  Recommended review of completed ACP document annually or upon change in health status

## 2018-11-09 ENCOUNTER — OFFICE VISIT (OUTPATIENT)
Dept: INTERNAL MEDICINE CLINIC | Age: 78
End: 2018-11-09

## 2018-11-09 VITALS
SYSTOLIC BLOOD PRESSURE: 170 MMHG | HEART RATE: 74 BPM | OXYGEN SATURATION: 97 % | HEIGHT: 65 IN | DIASTOLIC BLOOD PRESSURE: 78 MMHG | BODY MASS INDEX: 30.22 KG/M2 | WEIGHT: 181.4 LBS | RESPIRATION RATE: 18 BRPM | TEMPERATURE: 97.6 F

## 2018-11-09 DIAGNOSIS — I10 HYPERTENSION, UNSPECIFIED TYPE: ICD-10-CM

## 2018-11-09 RX ORDER — LOSARTAN POTASSIUM 100 MG/1
100 TABLET ORAL DAILY
Qty: 90 TAB | Refills: 1 | Status: SHIPPED | OUTPATIENT
Start: 2018-11-09 | End: 2019-01-09 | Stop reason: SDUPTHER

## 2018-11-09 NOTE — PROGRESS NOTES
1. Have you been to the ER, urgent care clinic since your last visit? Hospitalized since your last visit? No    2. Have you seen or consulted any other health care providers outside of the [facility] since your last visit? Include any pap smears or colon screening. No   Chief Complaint   Patient presents with    Hypertension     follow up     Not fasting    Abuse Screening Questionnaire 7/19/2018   Do you ever feel afraid of your partner? N   Are you in a relationship with someone who physically or mentally threatens you? N   Is it safe for you to go home? Y       PHQ over the last two weeks 11/9/2018   Little interest or pleasure in doing things Not at all   Feeling down, depressed, irritable, or hopeless Not at all   Total Score PHQ 2 0     Fall Risk Assessment, last 12 mths 11/9/2018   Able to walk? Yes   Fall in past 12 months?  No   Fall with injury? -   Number of falls in past 12 months -   Fall Risk Score -

## 2018-11-09 NOTE — PROGRESS NOTES
HISTORY OF PRESENT ILLNESS  Larry Powell is a 78 y.o. female. Chief Complaint   Patient presents with    Hypertension     follow up     Health Maintenance Due   Topic Date Due    Shingrix Vaccine Age 50> (1 of 2) 12/19/1992    EYE EXAM RETINAL OR DILATED Q1  01/13/2018     HPI  HTN - Restarted Losartan 50 mg (half of 100 mg tablet) at last visit last month. BP at home has been usually well controlled at 130-140s/80-90s. BP uncontrolled at first check:   BP Readings from Last 1 Encounters:   11/09/18 143/57     Worse at recheck:  BP Readings from Last 3 Encounters:   11/09/18 170/78   09/28/18 177/76   08/30/18 147/79     Wt Readings from Last 3 Encounters:   11/09/18 181 lb 6.4 oz (82.3 kg)   09/28/18 189 lb (85.7 kg)   08/30/18 187 lb 9.6 oz (85.1 kg)     Pt has been working on diet & exercise to lose weight! Pt has lost weight since last visit. Review of Systems   Respiratory: Negative for shortness of breath. Cardiovascular: Negative for chest pain and palpitations. Neurological: Negative for dizziness, loss of consciousness and weakness. Physical Exam   Constitutional: She is oriented to person, place, and time. She appears well-developed and well-nourished. No distress. HENT:   Head: Normocephalic and atraumatic. Neck: Neck supple. No JVD present. Cardiovascular: Normal rate, regular rhythm and normal heart sounds. Pulmonary/Chest: Effort normal and breath sounds normal. No respiratory distress. Musculoskeletal: She exhibits no edema. Neurological: She is alert and oriented to person, place, and time. Skin: Skin is warm and dry. Psychiatric: She has a normal mood and affect. Her behavior is normal. Judgment and thought content normal.   Nursing note and vitals reviewed. ASSESSMENT and PLAN  Diagnoses and all orders for this visit:    1. Hypertension, unspecified type  -    Increase dose to: losartan (COZAAR) 100 mg tablet; Take 1 Tab by mouth daily.

## 2018-11-26 RX ORDER — HUMAN INSULIN 100 [IU]/ML
INJECTION, SUSPENSION SUBCUTANEOUS
Qty: 20 ML | Refills: 1 | Status: SHIPPED | OUTPATIENT
Start: 2018-11-26 | End: 2019-01-22 | Stop reason: SDUPTHER

## 2019-01-09 ENCOUNTER — OFFICE VISIT (OUTPATIENT)
Dept: INTERNAL MEDICINE CLINIC | Age: 79
End: 2019-01-09

## 2019-01-09 VITALS
DIASTOLIC BLOOD PRESSURE: 73 MMHG | OXYGEN SATURATION: 96 % | HEIGHT: 65 IN | BODY MASS INDEX: 31.32 KG/M2 | RESPIRATION RATE: 18 BRPM | WEIGHT: 188 LBS | TEMPERATURE: 97.7 F | HEART RATE: 82 BPM | SYSTOLIC BLOOD PRESSURE: 162 MMHG

## 2019-01-09 DIAGNOSIS — I10 HYPERTENSION, UNSPECIFIED TYPE: ICD-10-CM

## 2019-01-09 DIAGNOSIS — E11.9 DIABETES MELLITUS WITHOUT COMPLICATION (HCC): Primary | ICD-10-CM

## 2019-01-09 RX ORDER — LOSARTAN POTASSIUM 100 MG/1
100 TABLET ORAL DAILY
Qty: 90 TAB | Refills: 1 | Status: SHIPPED | OUTPATIENT
Start: 2019-01-09 | End: 2019-07-18 | Stop reason: SDUPTHER

## 2019-01-09 RX ORDER — AMLODIPINE BESYLATE 5 MG/1
5 TABLET ORAL DAILY
Qty: 30 TAB | Refills: 1 | Status: SHIPPED | OUTPATIENT
Start: 2019-01-09 | End: 2019-03-05 | Stop reason: SDUPTHER

## 2019-01-09 NOTE — PROGRESS NOTES
1. Have you been to the ER, urgent care clinic since your last visit? Hospitalized since your last visit? No    2. Have you seen or consulted any other health care providers outside of the [address] [place] [address] since your last visit? Include any pap smears or colon screening. No   Chief Complaint   Patient presents with    Hypertension     follow up     Not fasting    Abuse Screening Questionnaire 1/9/2019   Do you ever feel afraid of your partner? N   Are you in a relationship with someone who physically or mentally threatens you? N   Is it safe for you to go home? Y     PHQ over the last two weeks 1/9/2019   Little interest or pleasure in doing things Not at all   Feeling down, depressed, irritable, or hopeless Not at all   Total Score PHQ 2 0     Fall Risk Assessment, last 12 mths 1/9/2019   Able to walk? Yes   Fall in past 12 months?  No   Fall with injury? -   Number of falls in past 12 months -   Fall Risk Score -

## 2019-01-09 NOTE — PROGRESS NOTES
Milagros Stern is a 70 y.o. female  Chief Complaint   Patient presents with    Hypertension     follow up        Health Maintenance Due   Topic Date Due    Shingrix Vaccine Age 50> (1 of 2) 12/19/1992    GLAUCOMA SCREENING Q2Y  01/13/2019    EYE EXAM RETINAL OR DILATED  01/13/2019       HPI  BP Readings from Last 3 Encounters:   01/09/19 162/73   11/09/18 170/78   09/28/18 177/76     Taking Losartan 100 mg daily at 9 AM. No s/e. Weight gain:   Wt Readings from Last 3 Encounters:   01/09/19 188 lb (85.3 kg)   11/09/18 181 lb 6.4 oz (82.3 kg)   09/28/18 189 lb (85.7 kg)     Planning to change diet/exercise to lose weight. Review of Systems   Respiratory: Negative for shortness of breath. Cardiovascular: Negative for chest pain and palpitations. Neurological: Negative for dizziness, loss of consciousness and weakness. Physical Exam   Constitutional: She is oriented to person, place, and time. She appears well-developed and well-nourished. No distress. HENT:   Head: Normocephalic and atraumatic. Neck: Neck supple. No JVD present. No bruit bilateral carotid arteries. Cardiovascular: Normal rate, regular rhythm and normal heart sounds. Pulmonary/Chest: Effort normal and breath sounds normal. No respiratory distress. Musculoskeletal: She exhibits no edema. Neurological: She is alert and oriented to person, place, and time. Skin: Skin is warm and dry. Psychiatric: She has a normal mood and affect. Her behavior is normal. Judgment and thought content normal.   Nursing note and vitals reviewed. Diagnoses and all orders for this visit:    1. Diabetes mellitus without complication (HCC)  -     BD INSULIN SYRINGE ULTRA-FINE 1/2 mL 31 gauge x 15/64\" syrg; USE AS DIRECTED TWICE A DAY    2. Hypertension, unspecified type  -     Add amLODIPine (NORVASC) 5 mg tablet; Take 1 Tab by mouth daily. -     Continue losartan (COZAAR) 100 mg tablet; Take 1 Tab by mouth daily.     Discussed diet/exercise and options for/importance of losing weight.

## 2019-01-22 RX ORDER — HUMAN INSULIN 100 [IU]/ML
INJECTION, SUSPENSION SUBCUTANEOUS
Qty: 20 ML | Refills: 1 | Status: SHIPPED | OUTPATIENT
Start: 2019-01-22 | End: 2019-02-13 | Stop reason: SDUPTHER

## 2019-01-23 ENCOUNTER — OFFICE VISIT (OUTPATIENT)
Dept: INTERNAL MEDICINE CLINIC | Age: 79
End: 2019-01-23

## 2019-01-23 VITALS
WEIGHT: 190.4 LBS | TEMPERATURE: 97.8 F | HEART RATE: 75 BPM | OXYGEN SATURATION: 99 % | DIASTOLIC BLOOD PRESSURE: 60 MMHG | SYSTOLIC BLOOD PRESSURE: 138 MMHG | BODY MASS INDEX: 31.72 KG/M2 | RESPIRATION RATE: 18 BRPM | HEIGHT: 65 IN

## 2019-01-23 DIAGNOSIS — E11.9 DIABETES MELLITUS WITHOUT COMPLICATION (HCC): ICD-10-CM

## 2019-01-23 DIAGNOSIS — I10 ESSENTIAL HYPERTENSION: Primary | ICD-10-CM

## 2019-01-23 RX ORDER — SYRING-NEEDL,DISP,INSUL,0.3 ML 31GX15/64"
SYRINGE, EMPTY DISPOSABLE MISCELLANEOUS
Refills: 3 | OUTPATIENT
Start: 2019-01-23

## 2019-01-23 NOTE — PROGRESS NOTES
1. Have you been to the ER, urgent care clinic since your last visit? Hospitalized since your last visit? No    2. Have you seen or consulted any other health care providers outside of the [address] [place] [address] since your last visit? Include any pap smears or colon screening. No   Chief Complaint   Patient presents with    Hypertension     follow up     Not fasting    Abuse Screening Questionnaire 1/9/2019   Do you ever feel afraid of your partner? N   Are you in a relationship with someone who physically or mentally threatens you? N   Is it safe for you to go home? Y     PHQ over the last two weeks 1/23/2019   Little interest or pleasure in doing things Not at all   Feeling down, depressed, irritable, or hopeless Not at all   Total Score PHQ 2 0     Discussed fall risk assessment with patient and provided fall risk prevention reference.

## 2019-01-23 NOTE — PROGRESS NOTES
Francis Kirby is a 70 y.o. female  Chief Complaint   Patient presents with    Hypertension     follow up        Health Maintenance Due   Topic Date Due    Shingrix Vaccine Age 50> (1 of 2) 12/19/1992    GLAUCOMA SCREENING Q2Y  01/13/2019    EYE EXAM RETINAL OR DILATED  01/13/2019       HPI  HTN -  Added Amlodipine 5 mg at last visit earlier this month. No s/e. BP at home: 128/65  Taking medications regularly:   Key CAD CHF Meds             amLODIPine (NORVASC) 5 mg tablet  (Taking) Take 1 Tab by mouth daily. losartan (COZAAR) 100 mg tablet  (Taking) Take 1 Tab by mouth daily. atorvastatin (LIPITOR) 80 mg tablet  (Taking) TAKE 1 TABLET BY MOUTH EVERY EVENING              Review of Systems   Respiratory: Negative for shortness of breath. Cardiovascular: Negative for chest pain and palpitations. Neurological: Negative for dizziness, loss of consciousness and weakness. Physical Exam   Constitutional: She is oriented to person, place, and time. She appears well-developed and well-nourished. No distress. HENT:   Head: Normocephalic and atraumatic. Neck: Neck supple. No JVD present. No bruit bilateral carotid arteries. Cardiovascular: Normal rate, regular rhythm and normal heart sounds. Pulmonary/Chest: Effort normal and breath sounds normal. No respiratory distress. Musculoskeletal: She exhibits no edema. Neurological: She is alert and oriented to person, place, and time. Skin: Skin is warm and dry. Psychiatric: She has a normal mood and affect. Her behavior is normal. Judgment and thought content normal.   Nursing note and vitals reviewed. Diagnoses and all orders for this visit:    1. Essential hypertension  Controlled. Follow up q6 months.

## 2019-03-03 DIAGNOSIS — E11.9 DIABETES MELLITUS WITHOUT COMPLICATION (HCC): ICD-10-CM

## 2019-03-03 RX ORDER — ATORVASTATIN CALCIUM 80 MG/1
TABLET, FILM COATED ORAL
Qty: 90 TAB | Refills: 3 | Status: SHIPPED | OUTPATIENT
Start: 2019-03-03 | End: 2019-08-21 | Stop reason: SDUPTHER

## 2019-03-03 RX ORDER — METFORMIN HYDROCHLORIDE 500 MG/1
TABLET, EXTENDED RELEASE ORAL
Qty: 90 TAB | Refills: 1 | Status: SHIPPED | OUTPATIENT
Start: 2019-03-03 | End: 2019-07-03 | Stop reason: SDUPTHER

## 2019-07-03 DIAGNOSIS — E11.9 DIABETES MELLITUS WITHOUT COMPLICATION (HCC): ICD-10-CM

## 2019-07-03 RX ORDER — METFORMIN HYDROCHLORIDE 500 MG/1
TABLET, EXTENDED RELEASE ORAL
Qty: 90 TAB | Refills: 0 | Status: SHIPPED | OUTPATIENT
Start: 2019-07-03 | End: 2019-08-21 | Stop reason: SDUPTHER

## 2019-07-18 DIAGNOSIS — I10 HYPERTENSION, UNSPECIFIED TYPE: ICD-10-CM

## 2019-07-18 RX ORDER — LOSARTAN POTASSIUM 100 MG/1
TABLET ORAL
Qty: 90 TAB | Refills: 0 | Status: SHIPPED | OUTPATIENT
Start: 2019-07-18 | End: 2019-10-01 | Stop reason: SDUPTHER

## 2019-08-14 NOTE — PROGRESS NOTES
Arpit Hadley is a 70 y.o. female  Chief Complaint   Patient presents with    Hypertension     follow up    Cholesterol Problem     follow up    Diabetes     follow up      Visit Vitals  /70   Pulse 71   Temp 97.4 °F (36.3 °C) (Oral)   Resp 16   Ht 5' 5\" (1.651 m)   Wt 185 lb (83.9 kg)   SpO2 98%   BMI 30.79 kg/m²       Health Maintenance Due   Topic Date Due    Shingrix Vaccine Age 50> (1 of 2) 12/19/1992    HEMOGLOBIN A1C Q6M  03/20/2019    MICROALBUMIN Q1  03/20/2019    MEDICARE YEARLY EXAM  03/21/2019    Influenza Age 9 to Adult  08/01/2019       Influenza: she will plan on getting it this fall. HPI  DM II - A1c and Lipids controlled at last check last year. Overdue for recheck. Fasting for labs today. Lab Results   Component Value Date/Time    Hemoglobin A1c 6.4 (H) 09/20/2018 10:14 AM    Hemoglobin A1c (POC) 8.0 08/04/2015 09:44 AM     Lab Results   Component Value Date/Time    Cholesterol, total 155 09/20/2018 10:14 AM    HDL Cholesterol 45 09/20/2018 10:14 AM    LDL, calculated 73 09/20/2018 10:14 AM    VLDL, calculated 37 09/20/2018 10:14 AM    Triglyceride 185 (H) 09/20/2018 10:14 AM    CHOL/HDL Ratio 4.1 09/08/2010 10:15 AM     Lab Results   Component Value Date/Time    Microalb/Creat ratio (ug/mg creat.) 17.3 03/20/2018 10:36 AM     Currently taking:   Key Antihyperglycemic Medications             metFORMIN ER (GLUCOPHAGE XR) 500 mg tablet TAKE 1 TABLET BY MOUTH EVERY DAY WITH DINNER    insulin NPH/insulin regular (NOVOLIN 70/30 U-100 INSULIN) 100 unit/mL (70-30) injection INJECT 40 UNITS UNDER THE SKIN EVERY MORNING AND 20 UNITS EVERY EVENING        Pt has lost weight since last visit. Wt Readings from Last 3 Encounters:   08/16/19 185 lb (83.9 kg)   01/23/19 190 lb 6.4 oz (86.4 kg)   01/09/19 188 lb (85.3 kg)     HTN Follow up - BP slightly uncontrolled today, but she has had a stressful morning this morning with her .  She notes that her BP at home was normal:  BP Readings from Last 3 Encounters:   08/16/19 142/70   01/23/19 138/60   01/09/19 162/73     Currently taking:   Key CAD CHF Meds             losartan (COZAAR) 100 mg tablet TAKE 1 TABLET BY MOUTH DAILY    amLODIPine (NORVASC) 5 mg tablet TAKE 1 TABLET BY MOUTH DAILY    atorvastatin (LIPITOR) 80 mg tablet TAKE 1 TABLET BY MOUTH EVERY EVENING        Pt notes that a rep from [address] [place] [address] told her she was taking too much Vitamin D3 - she is taking 6000 units/day. This is the Subsequent Medicare Annual Wellness Exam, performed 12 months or more after the Initial AWV or the last Subsequent AWV    I have reviewed the patient's medical history in detail and updated the computerized patient record. History     Past Medical History:   Diagnosis Date    Diabetes ([address]     Fatty liver     GERD (gastroesophageal reflux disease)     Hypercholesterolemia     Osteopenia 2010    White coat hypertension       Past Surgical History:   Procedure Laterality Date    HX CATARACT REMOVAL Bilateral 8/2012    HX COLONOSCOPY  2011    HX NA AND BSO  1987     Current Outpatient Medications   Medication Sig Dispense Refill    losartan (COZAAR) 100 mg tablet TAKE 1 TABLET BY MOUTH DAILY 90 Tab 0    metFORMIN ER (GLUCOPHAGE XR) 500 mg tablet TAKE 1 TABLET BY MOUTH EVERY DAY WITH DINNER 90 Tab 0    amLODIPine (NORVASC) 5 mg tablet TAKE 1 TABLET BY MOUTH DAILY 90 Tab 1    atorvastatin (LIPITOR) 80 mg tablet TAKE 1 TABLET BY MOUTH EVERY EVENING 90 Tab 3    insulin NPH/insulin regular (NOVOLIN 70/30 U-100 INSULIN) 100 unit/mL (70-30) injection INJECT 40 UNITS UNDER THE SKIN EVERY MORNING AND 20 UNITS EVERY EVENING 20 mL 5    BD INSULIN SYRINGE ULTRA-FINE 1/2 mL 31 gauge x 15/64\" syrg USE AS DIRECTED TWICE A  Pen Needle 3    CHOLECALCIFEROL, VITAMIN D3, (VITAMIN D3 PO) Take 1 Cap by mouth daily.  MULTIVITAMIN W-MINERALS/LUTEIN (CENTRUM SILVER PO) Take  by mouth.  Takes one po daily        Allergies   Allergen Reactions    Ace Inhibitors Nausea Only    Glucophage [Metformin] Other (comments)     CURRENTLY TAKES METFORMIN     Family History   Problem Relation Age of Onset    Diabetes Sister     Other Sister         arthritis    Heart Disease Mother     Diabetes Mother     Stroke Father      Social History     Tobacco Use    Smoking status: Never Smoker    Smokeless tobacco: Never Used   Substance Use Topics    Alcohol use: No     Patient Active Problem List   Diagnosis Code    Essential hypertension I10    Fatty liver K76.0    Diabetes mellitus without complication [address] [address] B30.5    Osteopenia M85.80    Pure hypercholesterolemia E78.00    White coat syndrome without hypertension R03.0    Living will, counseling/discussion Z71.89       Depression Risk Factor Screening:     3 most recent PHQ Screens 8/16/2019   Little interest or pleasure in doing things Not at all   Feeling down, depressed, irritable, or hopeless Not at all   Total Score PHQ 2 0     Alcohol Risk Factor Screening:    reports that she does not drink alcohol. Functional Ability and Level of Safety:   Hearing Loss  Hearing is good. Activities of Daily Living  The home contains: handrails  Patient does total self care     Fall Risk  Fall Risk Assessment, last 12 mths 8/16/2019   Able to walk? Yes   Fall in past 12 months? No   Fall with injury? -   Number of falls in past 12 months -   Fall Risk Score -       Abuse Screen  Patient is not abused    Cognitive Screening   Evaluation of Cognitive Function:  Has your family/caregiver stated any concerns about your memory: no     Patient Care Team   Patient Care Team:  Vaishali Chandra as PCP - General (Physician Assistant)  Mikal Rashid MD (Ophthalmology)    Assessment/Plan   Education and counseling provided:  Are appropriate based on today's review and evaluation    Extended Emergency Contact Information  Primary Emergency Contact:  [address] Phone: [phone]  Mobile Phone: [phone]  Relation: Child  Secondary Emergency Contact: Marv Phone: [phone]  Relation: Spouse    Review of Systems   Respiratory: Negative for shortness of breath. Cardiovascular: Negative for chest pain and palpitations. Neurological: Negative for dizziness, loss of consciousness and weakness. Physical Exam   Constitutional: She is oriented to person, place, and time. She appears well-developed and well-nourished. No distress. HENT:   Head: Normocephalic and atraumatic. Neck: Neck supple. No JVD present. No bruit bilateral carotid arteries. Cardiovascular: Normal rate, regular rhythm and normal heart sounds. Pulmonary/Chest: Effort normal and breath sounds normal. No respiratory distress. Musculoskeletal: She exhibits no edema. Neurological: She is alert and oriented to person, place, and time. Skin: Skin is warm and dry. Psychiatric: She has a normal mood and affect. Her behavior is normal. Judgment and thought content normal.   Nursing note and vitals reviewed. Diagnoses and all orders for this visit:    1. Encounter for Medicare annual wellness exam    2. Diabetes mellitus without complication (HCC)  -     METABOLIC PANEL, COMPREHENSIVE  -     HEMOGLOBIN A1C W/O EAG  -     MICROALBUMIN, UR, RAND W/ MICROALB/CREAT RATIO  -     LIPID PANEL    3. Osteopenia, unspecified location  -     VITAMIN D, 25 HYDROXY    4. Essential hypertension  BP slightly high today, but she has had a difficult morning with her . Continue monitoring regularly and follow up soon if it remains high at home. Congratulated pt on weight loss success and encouraged continued efforts.

## 2019-08-16 ENCOUNTER — OFFICE VISIT (OUTPATIENT)
Dept: INTERNAL MEDICINE CLINIC | Age: 79
End: 2019-08-16

## 2019-08-16 VITALS
HEIGHT: 65 IN | HEART RATE: 71 BPM | TEMPERATURE: 97.4 F | BODY MASS INDEX: 30.82 KG/M2 | WEIGHT: 185 LBS | SYSTOLIC BLOOD PRESSURE: 142 MMHG | OXYGEN SATURATION: 98 % | DIASTOLIC BLOOD PRESSURE: 70 MMHG | RESPIRATION RATE: 16 BRPM

## 2019-08-16 DIAGNOSIS — M85.80 OSTEOPENIA, UNSPECIFIED LOCATION: ICD-10-CM

## 2019-08-16 DIAGNOSIS — I10 ESSENTIAL HYPERTENSION: ICD-10-CM

## 2019-08-16 DIAGNOSIS — Z00.00 ENCOUNTER FOR MEDICARE ANNUAL WELLNESS EXAM: Primary | ICD-10-CM

## 2019-08-16 DIAGNOSIS — E11.9 DIABETES MELLITUS WITHOUT COMPLICATION (HCC): ICD-10-CM

## 2019-08-16 NOTE — PROGRESS NOTES
1. Have you been to the ER, urgent care clinic since your last visit? Hospitalized since your last visit? No    2. Have you seen or consulted any other health care providers outside of the [address] [place] [address] since your last visit? Include any pap smears or colon screening.  No   Chief Complaint   Patient presents with    Hypertension     follow up    Cholesterol Problem     follow up    Diabetes     follow up     Fasting

## 2019-08-21 ENCOUNTER — TELEPHONE (OUTPATIENT)
Dept: INTERNAL MEDICINE CLINIC | Age: 79
End: 2019-08-21

## 2019-08-21 DIAGNOSIS — E11.9 DIABETES MELLITUS WITHOUT COMPLICATION (HCC): ICD-10-CM

## 2019-08-21 DIAGNOSIS — I10 HYPERTENSION, UNSPECIFIED TYPE: ICD-10-CM

## 2019-08-21 RX ORDER — METFORMIN HYDROCHLORIDE 500 MG/1
TABLET, EXTENDED RELEASE ORAL
Qty: 90 TAB | Refills: 1 | Status: SHIPPED | OUTPATIENT
Start: 2019-08-21 | End: 2019-10-01 | Stop reason: SDUPTHER

## 2019-08-21 RX ORDER — ISOPROPYL ALCOHOL 70 ML/100ML
1 SWAB TOPICAL DAILY
Qty: 100 PAD | Refills: 3 | Status: SHIPPED | OUTPATIENT
Start: 2019-08-21 | End: 2019-08-28 | Stop reason: SDUPTHER

## 2019-08-21 RX ORDER — LANCETS
EACH MISCELLANEOUS
Qty: 1 EACH | Refills: 11 | Status: SHIPPED | OUTPATIENT
Start: 2019-08-21 | End: 2019-09-03 | Stop reason: SDUPTHER

## 2019-08-21 RX ORDER — BLOOD-GLUCOSE METER
1 EACH MISCELLANEOUS 2 TIMES DAILY
Qty: 1 EACH | Refills: 1 | Status: SHIPPED | OUTPATIENT
Start: 2019-08-21 | End: 2019-09-03 | Stop reason: SDUPTHER

## 2019-08-21 RX ORDER — ATORVASTATIN CALCIUM 80 MG/1
80 TABLET, FILM COATED ORAL DAILY
Qty: 90 TAB | Refills: 1 | Status: SHIPPED | OUTPATIENT
Start: 2019-08-21 | End: 2020-03-30

## 2019-08-21 RX ORDER — AMLODIPINE BESYLATE 5 MG/1
5 TABLET ORAL DAILY
Qty: 90 TAB | Refills: 1 | Status: SHIPPED | OUTPATIENT
Start: 2019-08-21 | End: 2019-10-01 | Stop reason: SDUPTHER

## 2019-08-21 NOTE — TELEPHONE ENCOUNTER
[address] requesting new prescription for Accu-Chek Irene Plus Meter, Accu-Chek Irene Plus Test Strips, Accu-Chek Soft Clix Lancets

## 2019-08-21 NOTE — TELEPHONE ENCOUNTER
[facility] also requesting new prescription for Amlodipine 5 mg, Atorvastatin 80 mg, Metformin  MG, Extended Release 24 HR, BD Single Use Swab, Accu-Chek Irene Solution

## 2019-08-23 LAB
25(OH)D3+25(OH)D2 SERPL-MCNC: 47.4 NG/ML (ref 30–100)
ALBUMIN SERPL-MCNC: 4.3 G/DL (ref 3.5–4.8)
ALBUMIN/CREAT UR: 9.5 MG/G CREAT (ref 0–30)
ALBUMIN/GLOB SERPL: 1.7 {RATIO} (ref 1.2–2.2)
ALP SERPL-CCNC: 108 IU/L (ref 39–117)
ALT SERPL-CCNC: 14 IU/L (ref 0–32)
AST SERPL-CCNC: 17 IU/L (ref 0–40)
BILIRUB SERPL-MCNC: 0.6 MG/DL (ref 0–1.2)
BUN SERPL-MCNC: 18 MG/DL (ref 8–27)
BUN/CREAT SERPL: 18 (ref 12–28)
CALCIUM SERPL-MCNC: 9.8 MG/DL (ref 8.7–10.3)
CHLORIDE SERPL-SCNC: 103 MMOL/L (ref 96–106)
CHOLEST SERPL-MCNC: 151 MG/DL (ref 100–199)
CO2 SERPL-SCNC: 24 MMOL/L (ref 20–29)
CREAT SERPL-MCNC: 1.01 MG/DL (ref 0.57–1)
CREAT UR-MCNC: 112 MG/DL
GLOBULIN SER CALC-MCNC: 2.6 G/DL (ref 1.5–4.5)
GLUCOSE SERPL-MCNC: 137 MG/DL (ref 65–99)
HBA1C MFR BLD: 6.1 % (ref 4.8–5.6)
HDLC SERPL-MCNC: 39 MG/DL
INTERPRETATION, 910389: NORMAL
INTERPRETATION: NORMAL
LDLC SERPL CALC-MCNC: 70 MG/DL (ref 0–99)
Lab: NORMAL
MICROALBUMIN UR-MCNC: 10.6 UG/ML
PDF IMAGE, 910387: NORMAL
POTASSIUM SERPL-SCNC: 4.7 MMOL/L (ref 3.5–5.2)
PROT SERPL-MCNC: 6.9 G/DL (ref 6–8.5)
SODIUM SERPL-SCNC: 143 MMOL/L (ref 134–144)
TRIGL SERPL-MCNC: 211 MG/DL (ref 0–149)
VLDLC SERPL CALC-MCNC: 42 MG/DL (ref 5–40)

## 2019-08-28 ENCOUNTER — TELEPHONE (OUTPATIENT)
Dept: INTERNAL MEDICINE CLINIC | Age: 79
End: 2019-08-28

## 2019-08-28 DIAGNOSIS — E11.9 DIABETES MELLITUS WITHOUT COMPLICATION (HCC): Primary | ICD-10-CM

## 2019-08-28 RX ORDER — ISOPROPYL ALCOHOL 70 ML/100ML
1 SWAB TOPICAL 2 TIMES DAILY
Qty: 200 PAD | Refills: 3 | Status: SHIPPED | OUTPATIENT
Start: 2019-08-28 | End: 2022-01-04 | Stop reason: SDUPTHER

## 2019-09-03 RX ORDER — LANCETS
EACH MISCELLANEOUS
Qty: 1 EACH | Refills: 11 | Status: SHIPPED | OUTPATIENT
Start: 2019-09-03 | End: 2022-05-26 | Stop reason: SDUPTHER

## 2019-09-03 RX ORDER — BLOOD-GLUCOSE METER
1 EACH MISCELLANEOUS 2 TIMES DAILY
Qty: 1 EACH | Refills: 1 | Status: SHIPPED | OUTPATIENT
Start: 2019-09-03 | End: 2022-05-24 | Stop reason: SDUPTHER

## 2019-10-01 DIAGNOSIS — E11.9 DIABETES MELLITUS WITHOUT COMPLICATION (HCC): ICD-10-CM

## 2019-10-01 DIAGNOSIS — I10 HYPERTENSION, UNSPECIFIED TYPE: ICD-10-CM

## 2019-10-01 RX ORDER — METFORMIN HYDROCHLORIDE 500 MG/1
TABLET, EXTENDED RELEASE ORAL
Qty: 90 TAB | Refills: 1 | Status: SHIPPED | OUTPATIENT
Start: 2019-10-01 | End: 2020-04-13

## 2019-10-01 RX ORDER — LOSARTAN POTASSIUM 100 MG/1
TABLET ORAL
Qty: 90 TAB | Refills: 1 | Status: SHIPPED | OUTPATIENT
Start: 2019-10-01 | End: 2022-10-03 | Stop reason: DRUGHIGH

## 2019-10-01 RX ORDER — AMLODIPINE BESYLATE 5 MG/1
TABLET ORAL
Qty: 90 TAB | Refills: 1 | Status: SHIPPED | OUTPATIENT
Start: 2019-10-01 | End: 2020-04-13

## 2020-02-12 ENCOUNTER — OFFICE VISIT (OUTPATIENT)
Dept: INTERNAL MEDICINE CLINIC | Age: 80
End: 2020-02-12

## 2020-02-12 ENCOUNTER — HOSPITAL ENCOUNTER (OUTPATIENT)
Dept: LAB | Age: 80
Discharge: HOME | End: 2020-02-12

## 2020-02-12 VITALS
WEIGHT: 181.6 LBS | BODY MASS INDEX: 30.26 KG/M2 | OXYGEN SATURATION: 98 % | DIASTOLIC BLOOD PRESSURE: 66 MMHG | TEMPERATURE: 98.5 F | HEIGHT: 65 IN | SYSTOLIC BLOOD PRESSURE: 143 MMHG | HEART RATE: 68 BPM

## 2020-02-12 DIAGNOSIS — E53.8 B12 DEFICIENCY: ICD-10-CM

## 2020-02-12 DIAGNOSIS — E78.00 PURE HYPERCHOLESTEROLEMIA: ICD-10-CM

## 2020-02-12 DIAGNOSIS — E11.9 DIABETES MELLITUS WITHOUT COMPLICATION (HCC): Primary | ICD-10-CM

## 2020-02-12 DIAGNOSIS — E11.9 DIABETES MELLITUS WITHOUT COMPLICATION (HCC): ICD-10-CM

## 2020-02-12 DIAGNOSIS — E55.9 VITAMIN D DEFICIENCY: ICD-10-CM

## 2020-02-12 DIAGNOSIS — I10 ESSENTIAL HYPERTENSION: ICD-10-CM

## 2020-02-12 LAB
25(OH)D3 SERPL-MCNC: 43.6 NG/ML (ref 30–100)
ALBUMIN SERPL-MCNC: 4.3 G/DL (ref 3.5–5)
ALBUMIN/GLOB SERPL: 1.4 {RATIO} (ref 1.1–2.2)
ALP SERPL-CCNC: 105 U/L (ref 45–117)
ALT SERPL-CCNC: 23 U/L (ref 12–78)
ANION GAP SERPL CALC-SCNC: 4 MMOL/L (ref 5–15)
AST SERPL-CCNC: 14 U/L (ref 15–37)
BASOPHILS # BLD: 0 K/UL (ref 0–0.1)
BASOPHILS NFR BLD: 0 % (ref 0–1)
BILIRUB SERPL-MCNC: 0.6 MG/DL (ref 0.2–1)
BUN SERPL-MCNC: 19 MG/DL (ref 6–20)
BUN/CREAT SERPL: 19 (ref 12–20)
CALCIUM SERPL-MCNC: 9.5 MG/DL (ref 8.5–10.1)
CHLORIDE SERPL-SCNC: 106 MMOL/L (ref 97–108)
CHOLEST SERPL-MCNC: 166 MG/DL
CO2 SERPL-SCNC: 29 MMOL/L (ref 21–32)
CREAT SERPL-MCNC: 0.99 MG/DL (ref 0.55–1.02)
CREAT UR-MCNC: 100 MG/DL
DIFFERENTIAL METHOD BLD: NORMAL
EOSINOPHIL # BLD: 0.1 K/UL (ref 0–0.4)
EOSINOPHIL NFR BLD: 1 % (ref 0–7)
ERYTHROCYTE [DISTWIDTH] IN BLOOD BY AUTOMATED COUNT: 12.9 % (ref 11.5–14.5)
EST. AVERAGE GLUCOSE BLD GHB EST-MCNC: 143 MG/DL
GLOBULIN SER CALC-MCNC: 3.1 G/DL (ref 2–4)
GLUCOSE SERPL-MCNC: 146 MG/DL (ref 65–100)
HBA1C MFR BLD: 6.6 % (ref 4–5.6)
HCT VFR BLD AUTO: 41 % (ref 35–47)
HDLC SERPL-MCNC: 52 MG/DL
HDLC SERPL: 3.2 {RATIO} (ref 0–5)
HGB BLD-MCNC: 13.1 G/DL (ref 11.5–16)
IMM GRANULOCYTES # BLD AUTO: 0 K/UL (ref 0–0.04)
IMM GRANULOCYTES NFR BLD AUTO: 0 % (ref 0–0.5)
LDLC SERPL CALC-MCNC: 85.2 MG/DL (ref 0–100)
LIPID PROFILE,FLP: NORMAL
LYMPHOCYTES # BLD: 1.8 K/UL (ref 0.8–3.5)
LYMPHOCYTES NFR BLD: 35 % (ref 12–49)
MCH RBC QN AUTO: 29.8 PG (ref 26–34)
MCHC RBC AUTO-ENTMCNC: 32 G/DL (ref 30–36.5)
MCV RBC AUTO: 93.2 FL (ref 80–99)
MICROALBUMIN UR-MCNC: 1.81 MG/DL
MICROALBUMIN/CREAT UR-RTO: 18 MG/G (ref 0–30)
MONOCYTES # BLD: 0.3 K/UL (ref 0–1)
MONOCYTES NFR BLD: 5 % (ref 5–13)
NEUTS SEG # BLD: 2.9 K/UL (ref 1.8–8)
NEUTS SEG NFR BLD: 59 % (ref 32–75)
NRBC # BLD: 0 K/UL (ref 0–0.01)
NRBC BLD-RTO: 0 PER 100 WBC
PLATELET # BLD AUTO: 158 K/UL (ref 150–400)
PMV BLD AUTO: 11.6 FL (ref 8.9–12.9)
POTASSIUM SERPL-SCNC: 4.3 MMOL/L (ref 3.5–5.1)
PROT SERPL-MCNC: 7.4 G/DL (ref 6.4–8.2)
RBC # BLD AUTO: 4.4 M/UL (ref 3.8–5.2)
SODIUM SERPL-SCNC: 139 MMOL/L (ref 136–145)
TRIGL SERPL-MCNC: 144 MG/DL (ref ?–150)
VIT B12 SERPL-MCNC: 642 PG/ML (ref 193–986)
VLDLC SERPL CALC-MCNC: 28.8 MG/DL
WBC # BLD AUTO: 5.1 K/UL (ref 3.6–11)

## 2020-03-17 RX ORDER — NAPROXEN SODIUM 220 MG
TABLET ORAL
Qty: 180 SYRINGE | Refills: 3 | Status: SHIPPED | OUTPATIENT
Start: 2020-03-17 | End: 2020-04-13

## 2020-03-30 RX ORDER — ATORVASTATIN CALCIUM 80 MG/1
TABLET, FILM COATED ORAL
Qty: 90 TAB | Refills: 1 | Status: SHIPPED | OUTPATIENT
Start: 2020-03-30 | End: 2020-04-13

## 2020-04-13 DIAGNOSIS — I10 HYPERTENSION, UNSPECIFIED TYPE: ICD-10-CM

## 2020-04-13 DIAGNOSIS — E11.9 DIABETES MELLITUS WITHOUT COMPLICATION (HCC): ICD-10-CM

## 2020-04-13 RX ORDER — AMLODIPINE BESYLATE 5 MG/1
TABLET ORAL
Qty: 90 TAB | Refills: 1 | Status: SHIPPED | OUTPATIENT
Start: 2020-04-13 | End: 2021-01-19

## 2020-04-13 RX ORDER — METFORMIN HYDROCHLORIDE 500 MG/1
TABLET, EXTENDED RELEASE ORAL
Qty: 90 TAB | Refills: 1 | Status: SHIPPED | OUTPATIENT
Start: 2020-04-13 | End: 2021-01-19

## 2020-04-13 RX ORDER — ATORVASTATIN CALCIUM 80 MG/1
TABLET, FILM COATED ORAL
Qty: 90 TAB | Refills: 1 | Status: SHIPPED | OUTPATIENT
Start: 2020-04-13 | End: 2020-11-11

## 2020-07-06 ENCOUNTER — TELEPHONE (OUTPATIENT)
Dept: INTERNAL MEDICINE CLINIC | Age: 80
End: 2020-07-06

## 2020-07-06 DIAGNOSIS — E11.9 DIABETES MELLITUS WITHOUT COMPLICATION (HCC): Primary | ICD-10-CM

## 2020-07-06 NOTE — TELEPHONE ENCOUNTER
----- Message from Yumiko Valdez sent at 7/2/2020  5:34 PM EDT -----  Regarding: Dr. Snider/Telephone  General Message/Vendor Calls    Caller's first and last name:  [facility]    Reason for call:  Requesting to speak with the nurse    Callback required yes/no and why:  yes    Best contact number(s):  [phone] ([phone]    Details to clarify the request:  Inquiring the status of request for prescription for 2 different medications \"Novolin 70/30 U 100 units per mL\" and \"BD insulin syringe U-500 half mL 31 gauge 15 by 54 inches\".      Yumiko Valdez

## 2020-07-07 RX ORDER — SYRINGE,INSUL U-500,NDL,0.5ML 31GX15/64"
SYRINGE, EMPTY DISPOSABLE MISCELLANEOUS
Qty: 180 SYRINGE | Refills: 3 | Status: SHIPPED | OUTPATIENT
Start: 2020-07-07

## 2020-07-15 ENCOUNTER — TELEPHONE (OUTPATIENT)
Dept: INTERNAL MEDICINE CLINIC | Age: 80
End: 2020-07-15

## 2020-07-15 DIAGNOSIS — E11.9 DIABETES MELLITUS WITHOUT COMPLICATION (HCC): Primary | ICD-10-CM

## 2020-07-15 NOTE — TELEPHONE ENCOUNTER
Call [facility]         Pharmacy says patient doesn't have a history of using this insulin.           insulin U-500 syringe-needle (BD Insulin Syringe U-500) 1/2 mL 31 gauge x 15/64\" syrg

## 2020-07-15 NOTE — TELEPHONE ENCOUNTER
Spoke with pharmacist from [facility] that  the order  for the syringe should be U-100 and not U-500 please update.

## 2020-07-17 RX ORDER — SYRINGE AND NEEDLE,INSULIN,1ML 31GX15/64"
SYRINGE, EMPTY DISPOSABLE MISCELLANEOUS
Qty: 180 PEN NEEDLE | Refills: 3 | Status: SHIPPED | OUTPATIENT
Start: 2020-07-17

## 2020-08-12 ENCOUNTER — VIRTUAL VISIT (OUTPATIENT)
Dept: INTERNAL MEDICINE CLINIC | Age: 80
End: 2020-08-12
Payer: MEDICARE

## 2020-08-12 DIAGNOSIS — I10 ESSENTIAL HYPERTENSION: ICD-10-CM

## 2020-08-12 DIAGNOSIS — E11.9 DIABETES MELLITUS WITHOUT COMPLICATION (HCC): ICD-10-CM

## 2020-08-12 DIAGNOSIS — E78.00 PURE HYPERCHOLESTEROLEMIA: ICD-10-CM

## 2020-08-12 DIAGNOSIS — Z00.00 ENCOUNTER FOR MEDICARE ANNUAL WELLNESS EXAM: Primary | ICD-10-CM

## 2020-08-12 PROCEDURE — G8399 PT W/DXA RESULTS DOCUMENT: HCPCS | Performed by: PHYSICIAN ASSISTANT

## 2020-08-12 PROCEDURE — G8427 DOCREV CUR MEDS BY ELIG CLIN: HCPCS | Performed by: PHYSICIAN ASSISTANT

## 2020-08-12 PROCEDURE — G0439 PPPS, SUBSEQ VISIT: HCPCS | Performed by: PHYSICIAN ASSISTANT

## 2020-08-12 PROCEDURE — G8432 DEP SCR NOT DOC, RNG: HCPCS | Performed by: PHYSICIAN ASSISTANT

## 2020-08-12 PROCEDURE — G8417 CALC BMI ABV UP PARAM F/U: HCPCS | Performed by: PHYSICIAN ASSISTANT

## 2020-08-12 PROCEDURE — 1101F PT FALLS ASSESS-DOCD LE1/YR: CPT | Performed by: PHYSICIAN ASSISTANT

## 2020-08-12 PROCEDURE — 99442 PR PHYS/QHP TELEPHONE EVALUATION 11-20 MIN: CPT | Performed by: PHYSICIAN ASSISTANT

## 2020-08-12 PROCEDURE — G8536 NO DOC ELDER MAL SCRN: HCPCS | Performed by: PHYSICIAN ASSISTANT

## 2020-08-12 PROCEDURE — G8756 NO BP MEASURE DOC: HCPCS | Performed by: PHYSICIAN ASSISTANT

## 2020-08-12 NOTE — PROGRESS NOTES
1. Have you been to the ER, urgent care clinic since your last visit? Hospitalized since your last visit? No    2. Have you seen or consulted any other health care providers outside of the [address] [place] [address] since your last visit? Include any pap smears or colon screening.  No   Chief Complaint   Patient presents with    Cholesterol Problem     follow up    Diabetes     follow up

## 2020-08-12 NOTE — PROGRESS NOTES
Prema Johnson is a 70 y.o. female who was seen by telephone on 08/12/20  Patient has no Virtual Visit capability available at time of visit. Consent: Prema Dixon , who was seen by telephone, and/or her healthcare decision maker, is aware that this patient-initiated, Telephone encounter on 08/12/20 is a billable service, with coverage as determined by his insurance carrier. she is aware that she  may receive a bill and has provided verbal consent to proceed: Yes    Assessment & Plan:   Diagnoses and all orders for this visit:    1. Encounter for Medicare annual wellness exam    2. Essential hypertension  Controlled     3. Diabetes mellitus without complication ([facility] [facility] - controlled  -     METABOLIC PANEL, COMPREHENSIVE; Future  -     HEMOGLOBIN A1C WITH EAG; Future    4. Pure hypercholesterolemia  Controlled     Congratulated pt on weight loss success and encouraged continued efforts. 17 minutes spent on this visit with patient today. 712  Subjective:   Prema Johnson is a 70 y.o. femalewho was seen for   Chief Complaint   Patient presents with    Cholesterol Problem     follow up    Diabetes     follow up     DM II & Cholesterol follow up - A1c & cholesterol well controlled at last check.     Lab Results   Component Value Date/Time    Hemoglobin A1c 6.6 (H) 02/12/2020 10:47 AM    Hemoglobin A1c (POC) 8.0 08/04/2015 09:44 AM     Lab Results   Component Value Date/Time    Cholesterol, total 166 02/12/2020 10:47 AM    HDL Cholesterol 52 02/12/2020 10:47 AM    LDL, calculated 85.2 02/12/2020 10:47 AM    VLDL, calculated 28.8 02/12/2020 10:47 AM    Triglyceride 144 02/12/2020 10:47 AM    CHOL/HDL Ratio 3.2 02/12/2020 10:47 AM     Lab Results   Component Value Date/Time    Microalbumin/Creat ratio (mg/g creat) 18 02/12/2020 10:47 AM    Microalbumin,urine random 1.81 02/12/2020 10:47 AM     Currently taking:   Key Antihyperglycemic Medications             insulin NPH/insulin regular (NovoLIN 70/30 U-100 Insulin) 100 unit/mL (70-30) injection (Taking) INJECT 40 UNITS UNDER THE SKIN EVERY MORNING AND 20 UNITS EVERY EVENING    metFORMIN ER (GLUCOPHAGE XR) 500 mg tablet (Taking) TAKE 1 TABLET BY MOUTH EVERY DAY WITH DINNER        Pt reports that she has lost weight recently. Now at 176 lb. Has been working on diet & exercise. Walking dog and gardening daily. Wt Readings from Last 3 Encounters:   02/12/20 181 lb 9.6 oz (82.4 kg)   08/16/19 185 lb (83.9 kg)   01/23/19 190 lb 6.4 oz (86.4 kg)     Review of Systems   Respiratory: Negative for shortness of breath. Cardiovascular: Negative for chest pain and palpitations. Neurological: Negative for dizziness, loss of consciousness and weakness. Objective:     Patient-Reported Vitals 8/12/2020   Patient-Reported Weight 176lb   Patient-Reported Pulse 74   Patient-Reported Systolic  560   Patient-Reported Diastolic 67      A&Ox 3. NAD. Breath sounds normal over phone. Conversation normal and consistent with stated mood. We discussed the expected course, resolution and complications of the diagnosis(es) in detail. Medication risks, benefits, costs, interactions, and alternatives were discussed as indicated. I advised him to contact the office if his condition worsens, changes or fails to improve as anticipated. He expressed understanding with the diagnosis(es) and plan. Suzanne Carrasco is a 70 y.o. female who was evaluated by a telephone visit encounter for concerns as above. Patient identification was verified prior to start of the visit. A caregiver was present when appropriate. Due to this being a telephone encounter ([facility]), evaluation of the following organ systems was limited: Vitals/Constitutional/EENT/Resp/CV/GI//MS/Neuro/Skin/Heme-Lymph-Imm.   Pursuant to the emergency declaration under the [address] [facility] waiver authority and the [employer] and [employer] Supplemental Appropriations Act, this Virtual  Visit was conducted, with patient's (and/or legal guardian's) consent, to reduce the patient's risk of exposure to COVID-19 and provide necessary medical care. Services were provided through a telephone discussion to substitute for in-person clinic visit. Patient and provider were located at their individual homes.       Roberto Lagos PA-C

## 2020-08-12 NOTE — PROGRESS NOTES
This is the Subsequent Medicare Annual Wellness Exam, performed 12 months or more after the Initial AWV or the last Subsequent AWV  Health Maintenance Due   Topic Date Due    Shingrix Vaccine Age 49> (1 of 2) 12/19/1992    Influenza Age 5 to Adult  08/01/2020    A1C test (Diabetic or Prediabetic)  08/12/2020    Medicare Yearly Exam  08/16/2020     Depression Risk Factor Screening:     3 most recent PHQ Screens 8/12/2020   Little interest or pleasure in doing things Not at all   Feeling down, depressed, irritable, or hopeless Several days   Total Score PHQ 2 1     Alcohol Risk Factor Screening:    reports no history of alcohol use. Functional Ability and Level of Safety:   Hearing Loss  Hearing is good. Activities of Daily Living  The home contains: handrails  Patient does total self care     Fall Risk  Fall Risk Assessment, last 12 mths 8/12/2020   Able to walk? Yes   Fall in past 12 months? Yes   Fall with injury? No   Number of falls in past 12 months 1   Fall Risk Score 1   Tripped on something in March. Abuse Screen  Patient is not abused    Cognitive Screening   Evaluation of Cognitive Function:  Has your family/caregiver stated any concerns about your memory: no     Patient Care Team   Patient Care Team:  Mahogany Fritz as PCP - General (Physician Assistant)  Cathie Rich MD (Ophthalmology)    Assessment/Plan   Education and counseling provided:  Are appropriate based on today's review and evaluation    Extended Emergency Contact Information  Primary Emergency Contact: [facility] Phone: [phone]  Mobile Phone: [phone]  Relation: Child  Secondary Emergency Contact: [facility] Phone: [phone]  Relation: Spouse    ACP - has one at home. Will bring us a copy. I have reviewed the patient's medical history in detail and updated the computerized patient record.      History     Past Medical History:   Diagnosis Date    Diabetes ([facility] [facility]     Fatty liver     GERD (gastroesophageal reflux disease)     Hypercholesterolemia     Osteopenia 2010    White coat hypertension       Past Surgical History:   Procedure Laterality Date    HX CATARACT REMOVAL Bilateral 8/2012    HX COLONOSCOPY  2011    HX NA AND BSO  1987     Current Outpatient Medications   Medication Sig Dispense Refill    insulin syringe-needle U-100 0.3 mL 31 gauge x 15/64\" syrg Use BID with insulin. Dx E11.9. 180 Pen Needle 3    insulin NPH/insulin regular (NovoLIN 70/30 U-100 Insulin) 100 unit/mL (70-30) injection INJECT 40 UNITS UNDER THE SKIN EVERY MORNING AND 20 UNITS EVERY EVENING 6 Vial 1    insulin U-500 syringe-needle (BD Insulin Syringe U-500) 1/2 mL 31 gauge x 15/64\" syrg Use twice daily with insulin. 180 Syringe 3    metFORMIN ER (GLUCOPHAGE XR) 500 mg tablet TAKE 1 TABLET BY MOUTH EVERY DAY WITH DINNER 90 Tab 1    amLODIPine (NORVASC) 5 mg tablet TAKE 1 TABLET EVERY DAY 90 Tab 1    atorvastatin (LIPITOR) 80 mg tablet TAKE 1 TABLET EVERY DAY 90 Tab 1    losartan (COZAAR) 100 mg tablet TAKE 1 TABLET BY MOUTH DAILY 90 Tab 1    lancets (ACCU-CHEK SOFTCLIX LANCETS) misc Test BID. E11.9 1 Each 11    Blood-Glucose Meter (ACCU-CHEK [place] PLUS METER) misc 1 Each by Does Not Apply route two (2) times a day. E11.9 1 Each 1    glucose blood VI test strips (ACCU-CHEK [place] PLUS TEST STRP) strip Test BID E11.9 200 Strip 3    alcohol swabs (BD SINGLE USE SWABS REGULAR) padm 1 Each by Apply Externally route two (2) times a day. E11.9 200 Pad 3    Blood Glucose Control High&Low (ACCU-CHEK [place] CONTROL SOLN) soln Test meter once weekly. E11.9 1 Bottle 1    CHOLECALCIFEROL, VITAMIN D3, (VITAMIN D3 PO) Take 1 Cap by mouth daily.  MULTIVITAMIN W-MINERALS/LUTEIN (CENTRUM SILVER PO) Take  by mouth.  Takes one po daily        Allergies   Allergen Reactions    Ace Inhibitors Nausea Only    Glucophage [Metformin] Other (comments)     CURRENTLY TAKES METFORMIN     Family History   Problem Relation Age of Onset    Diabetes Sister     Other Sister         arthritis    Heart Disease Mother     Diabetes Mother     Stroke Father      Social History     Tobacco Use    Smoking status: Never Smoker    Smokeless tobacco: Never Used   Substance Use Topics    Alcohol use: No     Patient Active Problem List   Diagnosis Code    Essential hypertension I10    Fatty liver K76.0    Diabetes mellitus without complication ([facility] [address] B69.7    Osteopenia M85.80    Pure hypercholesterolemia E78.00    White coat syndrome without hypertension R03.0    Living will, counseling/discussion Z71.05

## 2020-08-17 DIAGNOSIS — E11.9 DIABETES MELLITUS WITHOUT COMPLICATION (HCC): ICD-10-CM

## 2020-08-17 LAB
ALBUMIN SERPL-MCNC: 4.1 G/DL (ref 3.5–5)
ALBUMIN/GLOB SERPL: 1.4 {RATIO} (ref 1.1–2.2)
ALP SERPL-CCNC: 123 U/L (ref 45–117)
ALT SERPL-CCNC: 19 U/L (ref 12–78)
ANION GAP SERPL CALC-SCNC: 6 MMOL/L (ref 5–15)
AST SERPL-CCNC: 16 U/L (ref 15–37)
BILIRUB SERPL-MCNC: 0.7 MG/DL (ref 0.2–1)
BUN SERPL-MCNC: 18 MG/DL (ref 6–20)
BUN/CREAT SERPL: 21 (ref 12–20)
CALCIUM SERPL-MCNC: 9.5 MG/DL (ref 8.5–10.1)
CHLORIDE SERPL-SCNC: 106 MMOL/L (ref 97–108)
CO2 SERPL-SCNC: 28 MMOL/L (ref 21–32)
CREAT SERPL-MCNC: 0.87 MG/DL (ref 0.55–1.02)
EST. AVERAGE GLUCOSE BLD GHB EST-MCNC: 131 MG/DL
GLOBULIN SER CALC-MCNC: 3 G/DL (ref 2–4)
GLUCOSE SERPL-MCNC: 114 MG/DL (ref 65–100)
HBA1C MFR BLD: 6.2 % (ref 4–5.6)
POTASSIUM SERPL-SCNC: 4.2 MMOL/L (ref 3.5–5.1)
PROT SERPL-MCNC: 7.1 G/DL (ref 6.4–8.2)
SODIUM SERPL-SCNC: 140 MMOL/L (ref 136–145)

## 2020-08-21 ENCOUNTER — TELEPHONE (OUTPATIENT)
Dept: INTERNAL MEDICINE CLINIC | Age: 80
End: 2020-08-21

## 2020-08-21 DIAGNOSIS — M25.512 CHRONIC LEFT SHOULDER PAIN: Primary | ICD-10-CM

## 2020-08-21 DIAGNOSIS — G89.29 CHRONIC LEFT SHOULDER PAIN: Primary | ICD-10-CM

## 2020-08-21 NOTE — TELEPHONE ENCOUNTER
Patient is calling she spoke with Imelda Cutler last week about her arm pain and was told to call back if not any better and she would refer her out somewhere, please advise [phone]

## 2020-10-27 RX ORDER — BLOOD SUGAR DIAGNOSTIC
STRIP MISCELLANEOUS
Qty: 200 STRIP | Refills: 3 | Status: SHIPPED | OUTPATIENT
Start: 2020-10-27 | End: 2022-02-04

## 2020-11-11 RX ORDER — ATORVASTATIN CALCIUM 80 MG/1
TABLET, FILM COATED ORAL
Qty: 90 TAB | Refills: 1 | Status: SHIPPED | OUTPATIENT
Start: 2020-11-11 | End: 2021-07-23

## 2021-01-19 DIAGNOSIS — E11.9 DIABETES MELLITUS WITHOUT COMPLICATION (HCC): ICD-10-CM

## 2021-01-19 DIAGNOSIS — I10 HYPERTENSION, UNSPECIFIED TYPE: ICD-10-CM

## 2021-01-19 RX ORDER — METFORMIN HYDROCHLORIDE 500 MG/1
TABLET, EXTENDED RELEASE ORAL
Qty: 90 TAB | Refills: 0 | Status: SHIPPED | OUTPATIENT
Start: 2021-01-19 | End: 2021-05-25

## 2021-01-19 RX ORDER — AMLODIPINE BESYLATE 5 MG/1
TABLET ORAL
Qty: 90 TAB | Refills: 0 | Status: SHIPPED | OUTPATIENT
Start: 2021-01-19 | End: 2021-05-25

## 2021-02-08 DIAGNOSIS — E11.9 DIABETES MELLITUS WITHOUT COMPLICATION (HCC): ICD-10-CM

## 2021-06-22 DIAGNOSIS — I10 HYPERTENSION, UNSPECIFIED TYPE: ICD-10-CM

## 2021-06-22 DIAGNOSIS — E11.9 DIABETES MELLITUS WITHOUT COMPLICATION (HCC): ICD-10-CM

## 2021-06-23 RX ORDER — METFORMIN HYDROCHLORIDE 500 MG/1
TABLET, EXTENDED RELEASE ORAL
Qty: 15 TABLET | Refills: 0 | OUTPATIENT
Start: 2021-06-23

## 2021-06-23 RX ORDER — AMLODIPINE BESYLATE 5 MG/1
TABLET ORAL
Qty: 15 TABLET | Refills: 0 | OUTPATIENT
Start: 2021-06-23

## 2021-07-07 ENCOUNTER — OFFICE VISIT (OUTPATIENT)
Dept: INTERNAL MEDICINE CLINIC | Age: 81
End: 2021-07-07
Payer: MEDICARE

## 2021-07-07 VITALS
SYSTOLIC BLOOD PRESSURE: 144 MMHG | HEART RATE: 64 BPM | BODY MASS INDEX: 28.99 KG/M2 | OXYGEN SATURATION: 98 % | HEIGHT: 65 IN | TEMPERATURE: 98.5 F | WEIGHT: 174 LBS | DIASTOLIC BLOOD PRESSURE: 61 MMHG

## 2021-07-07 DIAGNOSIS — E11.9 DIABETES MELLITUS WITHOUT COMPLICATION (HCC): ICD-10-CM

## 2021-07-07 DIAGNOSIS — I10 HYPERTENSION, UNSPECIFIED TYPE: Primary | ICD-10-CM

## 2021-07-07 DIAGNOSIS — Z11.59 ENCOUNTER FOR HEPATITIS C SCREENING TEST FOR LOW RISK PATIENT: ICD-10-CM

## 2021-07-07 DIAGNOSIS — L25.5 CONTACT DERMATITIS DUE TO PLANTS, EXCEPT FOOD, UNSPECIFIED CONTACT DERMATITIS TYPE: ICD-10-CM

## 2021-07-07 PROCEDURE — G8754 DIAS BP LESS 90: HCPCS | Performed by: PHYSICIAN ASSISTANT

## 2021-07-07 PROCEDURE — G8432 DEP SCR NOT DOC, RNG: HCPCS | Performed by: PHYSICIAN ASSISTANT

## 2021-07-07 PROCEDURE — G8399 PT W/DXA RESULTS DOCUMENT: HCPCS | Performed by: PHYSICIAN ASSISTANT

## 2021-07-07 PROCEDURE — G8427 DOCREV CUR MEDS BY ELIG CLIN: HCPCS | Performed by: PHYSICIAN ASSISTANT

## 2021-07-07 PROCEDURE — 99214 OFFICE O/P EST MOD 30 MIN: CPT | Performed by: PHYSICIAN ASSISTANT

## 2021-07-07 PROCEDURE — G8536 NO DOC ELDER MAL SCRN: HCPCS | Performed by: PHYSICIAN ASSISTANT

## 2021-07-07 PROCEDURE — G8753 SYS BP > OR = 140: HCPCS | Performed by: PHYSICIAN ASSISTANT

## 2021-07-07 PROCEDURE — G8419 CALC BMI OUT NRM PARAM NOF/U: HCPCS | Performed by: PHYSICIAN ASSISTANT

## 2021-07-07 PROCEDURE — 1090F PRES/ABSN URINE INCON ASSESS: CPT | Performed by: PHYSICIAN ASSISTANT

## 2021-07-07 PROCEDURE — 1101F PT FALLS ASSESS-DOCD LE1/YR: CPT | Performed by: PHYSICIAN ASSISTANT

## 2021-07-07 RX ORDER — HYDROCORTISONE 1 %
CREAM (GRAM) TOPICAL
Qty: 30 G | Refills: 0 | COMMUNITY
Start: 2021-07-07

## 2021-07-07 RX ORDER — METFORMIN HYDROCHLORIDE 500 MG/1
500 TABLET, EXTENDED RELEASE ORAL
Qty: 90 TABLET | Refills: 1 | Status: SHIPPED | OUTPATIENT
Start: 2021-07-07 | End: 2022-01-04 | Stop reason: SDUPTHER

## 2021-07-07 RX ORDER — TRIAMCINOLONE ACETONIDE 1 MG/G
OINTMENT TOPICAL 2 TIMES DAILY
Qty: 60 G | Refills: 1 | Status: SHIPPED | OUTPATIENT
Start: 2021-07-07

## 2021-07-07 RX ORDER — AMLODIPINE BESYLATE 10 MG/1
10 TABLET ORAL DAILY
Qty: 90 TABLET | Refills: 1 | Status: SHIPPED | OUTPATIENT
Start: 2021-07-07 | End: 2022-04-18

## 2021-07-07 RX ORDER — CETIRIZINE HCL 10 MG
10 TABLET ORAL
COMMUNITY

## 2021-07-07 NOTE — PROGRESS NOTES
Identified pt with two pt identifiers(name and ). Reviewed record in preparation for visit and have obtained necessary documentation. Chief Complaint   Patient presents with    Follow-up    Hypertension    Diabetes    Cholesterol Problem        Vitals:    21 1028   BP: (!) 144/61   Pulse: 64   Temp: 98.5 °F (36.9 °C)   TempSrc: Temporal   SpO2: 98%   Weight: 174 lb (78.9 kg)   Height: 5' 5\" (1.651 m)   PainSc:   0 - No pain       Health Maintenance Due   Topic    Hepatitis C Screening     Shingrix Vaccine Age 50> (1 of 2)    MICROALBUMIN Q1     Lipid Screen     A1C test (Diabetic or Prediabetic)     Eye Exam Retinal or Dilated        Coordination of Care Questionnaire:  :   1) Have you been to an emergency room, urgent care, or hospitalized since your last visit? If yes, where when, and reason for visit? No      2. Have seen or consulted any other health care provider since your last visit? If yes, where when, and reason for visit?   No

## 2021-07-07 NOTE — PROGRESS NOTES
Kasey Colón is a 68 y.o. female  Chief Complaint   Patient presents with    Follow-up    Hypertension    Diabetes    Cholesterol Problem     Visit Vitals  BP (!) 144/61 (BP 1 Location: Left upper arm, BP Patient Position: Sitting, BP Cuff Size: Large adult)   Pulse 64   Temp 98.5 °F (36.9 °C) (Temporal)   Ht 5' 5\" (1.651 m)   Wt 174 lb (78.9 kg)   SpO2 98%   BMI 28.96 kg/m²      Health Maintenance Due   Topic Date Due    Hepatitis C Screening  Never done    Shingrix Vaccine Age 50> (1 of 2) Never done    MICROALBUMIN Q1  02/12/2021    Lipid Screen  02/12/2021    A1C test (Diabetic or Prediabetic)  02/17/2021    Eye Exam Retinal or Dilated  05/22/2021       HPI  DM II - Controlled at last check. Overdue for recheck. Fasting for labs today. Lab Results   Component Value Date/Time    Hemoglobin A1c 6.2 (H) 08/17/2020 09:46 AM    Hemoglobin A1c 6.6 (H) 02/12/2020 10:47 AM    Hemoglobin A1c 6.1 (H) 08/22/2019 09:25 AM    Glucose 114 (H) 08/17/2020 09:46 AM    Microalbumin/Creat ratio (mg/g creat) 18 02/12/2020 10:47 AM    Microalbumin,urine random 1.81 02/12/2020 10:47 AM    LDL, calculated 85.2 02/12/2020 10:47 AM    Creatinine 0.87 08/17/2020 09:46 AM     Currently taking:   Key Antihyperglycemic Medications             metFORMIN ER (GLUCOPHAGE XR) 500 mg tablet (Taking) TAKE 1 TABLET EVERY DAY WITH DINNER    insulin NPH/insulin regular (NovoLIN 70/30 U-100 Insulin) 100 unit/mL (70-30) injection (Taking) INJECT 40 UNITS UNDER THE SKIN EVERY MORNING AND 20 UNITS EVERY EVENING        Pt has lost weight! Working on diet & exercise. Wt Readings from Last 3 Encounters:   07/07/21 174 lb (78.9 kg)   02/12/20 181 lb 9.6 oz (82.4 kg)   08/16/19 185 lb (83.9 kg)     HTN Follow up - BP remains uncontrolled. Pt notes it is normal at home, but it has never been normal here.  She is not checking BP outside of home.:  BP Readings from Last 3 Encounters:   07/07/21 (!) 144/61   02/12/20 143/66   08/16/19 142/70 Currently taking:   Key CAD CHF Meds             amLODIPine (NORVASC) 5 mg tablet (Taking) TAKE 1 TABLET EVERY DAY    atorvastatin (LIPITOR) 80 mg tablet (Taking) TAKE 1 TABLET EVERY DAY    losartan (COZAAR) 100 mg tablet (Taking) TAKE 1 TABLET BY MOUTH DAILY        Was working in the garden and developed rash R face and R arm. Suspects poison ivy. Itchy. No SOB. ROS  Review of Systems   Constitutional: Negative for fever. Respiratory: Negative for shortness of breath. Cardiovascular: Negative for chest pain and palpitations. Skin: Positive for itching and rash. Neurological: Negative for dizziness, loss of consciousness and weakness. Psychiatric/Behavioral: Negative for depression. The patient is not nervous/anxious. EXAM  Physical Exam  Vitals and nursing note reviewed. Constitutional:       General: She is not in acute distress. Appearance: She is well-developed. HENT:      Head: Normocephalic and atraumatic. Neck:      Vascular: No JVD. Cardiovascular:      Rate and Rhythm: Normal rate and regular rhythm. Heart sounds: Normal heart sounds. Pulmonary:      Effort: Pulmonary effort is normal. No respiratory distress. Breath sounds: Normal breath sounds. Musculoskeletal:      Cervical back: Neck supple. Skin:     General: Skin is warm and dry. Comments: Discrete areas of erythema on R face and R arm. No unusual warmth or purulent discharge. Neurological:      Mental Status: She is alert and oriented to person, place, and time. Psychiatric:         Mood and Affect: Mood normal.         Behavior: Behavior normal.         Thought Content: Thought content normal.         Judgment: Judgment normal.       ASSESSMENT/PLAN  Encounter Diagnoses     ICD-10-CM ICD-9-CM   1. Hypertension, unspecified type  I10 401.9   2. Diabetes mellitus without complication ([facility])  Y42.4 250.00   3.  Contact dermatitis due to plants, except food, unspecified contact dermatitis type  L25.5 692.6   4.  Encounter for hepatitis C screening test for low risk patient  Z11.59 V73.89     Orders Placed This Encounter    METABOLIC PANEL, COMPREHENSIVE    HEMOGLOBIN A1C WITH EAG    LIPID PANEL    MICROALBUMIN, UR, RAND W/ MICROALB/CREAT RATIO    HEPATITIS C AB    triamcinolone acetonide (KENALOG) 0.1 % ointment    cetirizine (ZYRTEC) 10 mg tablet    hydrocortisone (CORTAID) 1 % topical cream    Increase dose to: amLODIPine (NORVASC) 10 mg tablet    Refill metFORMIN ER (GLUCOPHAGE XR) 500 mg tablet

## 2021-07-21 ENCOUNTER — OFFICE VISIT (OUTPATIENT)
Dept: INTERNAL MEDICINE CLINIC | Age: 81
End: 2021-07-21
Payer: MEDICARE

## 2021-07-21 VITALS
DIASTOLIC BLOOD PRESSURE: 60 MMHG | HEIGHT: 65 IN | TEMPERATURE: 98.2 F | WEIGHT: 175 LBS | OXYGEN SATURATION: 98 % | BODY MASS INDEX: 29.16 KG/M2 | HEART RATE: 74 BPM | SYSTOLIC BLOOD PRESSURE: 138 MMHG | RESPIRATION RATE: 16 BRPM

## 2021-07-21 DIAGNOSIS — E11.9 DIABETES MELLITUS WITHOUT COMPLICATION (HCC): ICD-10-CM

## 2021-07-21 DIAGNOSIS — Z00.00 ENCOUNTER FOR MEDICARE ANNUAL WELLNESS EXAM: Primary | ICD-10-CM

## 2021-07-21 DIAGNOSIS — I10 ESSENTIAL HYPERTENSION: ICD-10-CM

## 2021-07-21 PROCEDURE — G8754 DIAS BP LESS 90: HCPCS | Performed by: PHYSICIAN ASSISTANT

## 2021-07-21 PROCEDURE — G8752 SYS BP LESS 140: HCPCS | Performed by: PHYSICIAN ASSISTANT

## 2021-07-21 PROCEDURE — G8399 PT W/DXA RESULTS DOCUMENT: HCPCS | Performed by: PHYSICIAN ASSISTANT

## 2021-07-21 PROCEDURE — 99213 OFFICE O/P EST LOW 20 MIN: CPT | Performed by: PHYSICIAN ASSISTANT

## 2021-07-21 PROCEDURE — G8536 NO DOC ELDER MAL SCRN: HCPCS | Performed by: PHYSICIAN ASSISTANT

## 2021-07-21 PROCEDURE — G8419 CALC BMI OUT NRM PARAM NOF/U: HCPCS | Performed by: PHYSICIAN ASSISTANT

## 2021-07-21 PROCEDURE — 1090F PRES/ABSN URINE INCON ASSESS: CPT | Performed by: PHYSICIAN ASSISTANT

## 2021-07-21 PROCEDURE — 1101F PT FALLS ASSESS-DOCD LE1/YR: CPT | Performed by: PHYSICIAN ASSISTANT

## 2021-07-21 PROCEDURE — G8427 DOCREV CUR MEDS BY ELIG CLIN: HCPCS | Performed by: PHYSICIAN ASSISTANT

## 2021-07-21 PROCEDURE — G0439 PPPS, SUBSEQ VISIT: HCPCS | Performed by: PHYSICIAN ASSISTANT

## 2021-07-21 PROCEDURE — G8432 DEP SCR NOT DOC, RNG: HCPCS | Performed by: PHYSICIAN ASSISTANT

## 2021-07-21 NOTE — PROGRESS NOTES
This is the Subsequent Medicare Annual Wellness Exam, performed 12 months or more after the Initial AWV or the last Subsequent AWV  Health Maintenance Due   Topic Date Due    Shingrix Vaccine Age 49> (1 of 2) Never done    Eye Exam Retinal or Dilated  05/22/2021    Medicare Yearly Exam  08/13/2021     Depression Risk Factor Screening:     3 most recent PHQ Screens 7/21/2021   Little interest or pleasure in doing things Not at all   Feeling down, depressed, irritable, or hopeless Not at all   Total Score PHQ 2 0       Abuse Screen  Patient is not abused    Fall Risk  Fall Risk Assessment, last 12 mths 7/21/2021   Able to walk? Yes   Fall in past 12 months? 0   Do you feel unsteady? 1   Are you worried about falling 1   Is the gait abnormal? 0   Number of falls in past 12 months -   Fall with injury? -   Declines PT    Alcohol Risk Factor Screening:    reports no history of alcohol use. Functional Ability and Level of Safety:   Hearing Loss  Hearing is good. Activities of Daily Living  The home contains: handrails  Patient does total self care     Cognitive Screening   Evaluation of Cognitive Function:  Has your family/caregiver stated any concerns about your memory: no     Patient Care Team   Patient Care Team:  Anju Nye as PCP - General (Physician Assistant)  Cinthia Yang PA-C as PCP - [facility] Empaneled Provider  Tona Kaufman MD (Ophthalmology)    Assessment/Plan   Education and counseling provided:  Are appropriate based on today's review and evaluation    Extended Emergency Contact Information  Primary Emergency Contact: [facility] Phone: [phone]  Mobile Phone: [phone]  Relation: Child  Secondary Emergency Contact: [facility] Phone: [phone]  Relation: Spouse    ACP discussed. FOrm given. I have reviewed the patient's medical history in detail and updated the computerized patient record.      History     Past Medical History:   Diagnosis Date    Diabetes ([facility] [address]     Fatty liver     GERD (gastroesophageal reflux disease)     Hypercholesterolemia     Osteopenia 2010    White coat hypertension       Past Surgical History:   Procedure Laterality Date    HX CATARACT REMOVAL Bilateral 8/2012    HX COLONOSCOPY  2011    HX NA AND BSO  1987     Current Outpatient Medications   Medication Sig Dispense Refill    triamcinolone acetonide (KENALOG) 0.1 % ointment Apply  to affected area two (2) times a day. use thin layer 60 g 1    cetirizine (ZYRTEC) 10 mg tablet Take 1 Tablet by mouth nightly. Indications: a type of allergy that causes red and itchy skin called atopic dermatitis, hives      hydrocortisone (CORTAID) 1 % topical cream Apply  to affected area two (2) times a day. use thin layer on Right face/eyelid. 30 g 0    amLODIPine (NORVASC) 10 mg tablet Take 1 Tablet by mouth daily. 90 Tablet 1    metFORMIN ER (GLUCOPHAGE XR) 500 mg tablet Take 1 Tablet by mouth daily (with dinner). 90 Tablet 1    insulin NPH/insulin regular (NovoLIN 70/30 U-100 Insulin) 100 unit/mL (70-30) injection INJECT 40 UNITS UNDER THE SKIN EVERY MORNING AND 20 UNITS EVERY EVENING 60 mL 1    atorvastatin (LIPITOR) 80 mg tablet TAKE 1 TABLET EVERY DAY 90 Tab 1    glucose blood VI test strips (Accu-Chek Irene Plus test strp) strip TEST BLOOD SUGAR TWICE DAILY 200 Strip 3    insulin syringe-needle U-100 0.3 mL 31 gauge x 15/64\" syrg Use BID with insulin. Dx E11.9. 180 Pen Needle 3    insulin U-500 syringe-needle (BD Insulin Syringe U-500) 1/2 mL 31 gauge x 15/64\" syrg Use twice daily with insulin. 180 Syringe 3    losartan (COZAAR) 100 mg tablet TAKE 1 TABLET BY MOUTH DAILY 90 Tab 1    lancets (ACCU-CHEK SOFTCLIX LANCETS) misc Test BID. E11.9 1 Each 11    Blood-Glucose Meter (ACCU-CHEK IRENE PLUS METER) misc 1 Each by Does Not Apply route two (2) times a day.  E11.9 1 Each 1    alcohol swabs (BD SINGLE USE SWABS REGULAR) padm 1 Each by Apply Externally route two (2) times a day. E11.9 200 Pad 3    Blood Glucose Control High&Low (ACCU-CHEK [place] CONTROL SOLN) soln Test meter once weekly. E11.9 1 Bottle 1    MULTIVITAMIN W-MINERALS/LUTEIN (CENTRUM SILVER PO) Take  by mouth.  Takes one po daily        Allergies   Allergen Reactions    Ace Inhibitors Nausea Only     Family History   Problem Relation Age of Onset    Diabetes Sister     Other Sister         arthritis    Heart Disease Mother     Diabetes Mother     Stroke Father      Social History     Tobacco Use    Smoking status: Never Smoker    Smokeless tobacco: Never Used   Substance Use Topics    Alcohol use: No     Patient Active Problem List   Diagnosis Code    Essential hypertension I10    Fatty liver K76.0    Diabetes mellitus without complication ([facility] [address] T07.6    Osteopenia M85.80    Pure hypercholesterolemia E78.00    White coat syndrome without hypertension R03.0    Living will, counseling/discussion Z71.89

## 2021-07-21 NOTE — PROGRESS NOTES
Julianne Hoffmann is a 68 y.o. female  Chief Complaint   Patient presents with    Hypertension     follow up     Visit Vitals  /60   Pulse 74   Temp 98.2 °F (36.8 °C) (Temporal)   Resp 16   Ht 5' 5\" (1.651 m)   Wt 175 lb (79.4 kg)   SpO2 98%   BMI 29.12 kg/m²      Health Maintenance Due   Topic Date Due    Shingrix Vaccine Age 49> (1 of 2) Never done    Eye Exam Retinal or Dilated  05/22/2021    Medicare Yearly Exam  08/13/2021       HPI  HTN Follow up - BP controlled at recheck:  BP Readings from Last 3 Encounters:   07/21/21 138/60   07/07/21 (!) 144/61   02/12/20 143/66     Currently taking:   Key CAD CHF Meds             amLODIPine (NORVASC) 10 mg tablet (Taking) Take 1 Tablet by mouth daily. atorvastatin (LIPITOR) 80 mg tablet (Taking) TAKE 1 TABLET EVERY DAY    losartan (COZAAR) 100 mg tablet (Taking) TAKE 1 TABLET BY MOUTH DAILY          ROS  Review of Systems   Respiratory: Negative for shortness of breath. Cardiovascular: Negative for chest pain and palpitations. Neurological: Negative for dizziness, loss of consciousness and weakness. EXAM  Physical Exam  Vitals and nursing note reviewed. Constitutional:       General: She is not in acute distress. Appearance: She is well-developed. HENT:      Head: Normocephalic and atraumatic. Neck:      Vascular: No JVD. Cardiovascular:      Rate and Rhythm: Normal rate and regular rhythm. Heart sounds: Normal heart sounds. Pulmonary:      Effort: Pulmonary effort is normal. No respiratory distress. Breath sounds: Normal breath sounds. Musculoskeletal:      Cervical back: Neck supple. Skin:     General: Skin is warm and dry. Neurological:      Mental Status: She is alert and oriented to person, place, and time. Psychiatric:         Mood and Affect: Mood normal.         Behavior: Behavior normal.         Thought Content:  Thought content normal.         Judgment: Judgment normal.       ASSESSMENT/PLAN  Encounter Diagnoses     ICD-10-CM ICD-9-CM   1. Encounter for Medicare annual wellness exam  Z00.00 V70.0   2.  Essential hypertension - controlled I10 401.9

## 2021-07-23 RX ORDER — ATORVASTATIN CALCIUM 80 MG/1
TABLET, FILM COATED ORAL
Qty: 90 TABLET | Refills: 1 | Status: SHIPPED | OUTPATIENT
Start: 2021-07-23 | End: 2022-03-16

## 2021-07-23 RX ORDER — SYRGE-NDL,INS 0.5 ML HALF MARK 31GX15/64"
SYRINGE, EMPTY DISPOSABLE MISCELLANEOUS
Qty: 200 SYRINGE | Refills: 3 | Status: SHIPPED | OUTPATIENT
Start: 2021-07-23

## 2021-11-11 ENCOUNTER — OFFICE VISIT (OUTPATIENT)
Dept: INTERNAL MEDICINE CLINIC | Age: 81
End: 2021-11-11
Payer: MEDICARE

## 2021-11-11 VITALS
TEMPERATURE: 98.3 F | HEART RATE: 70 BPM | HEIGHT: 65 IN | OXYGEN SATURATION: 98 % | WEIGHT: 173 LBS | RESPIRATION RATE: 20 BRPM | BODY MASS INDEX: 28.82 KG/M2 | DIASTOLIC BLOOD PRESSURE: 60 MMHG | SYSTOLIC BLOOD PRESSURE: 156 MMHG

## 2021-11-11 DIAGNOSIS — N30.00 ACUTE CYSTITIS WITHOUT HEMATURIA: Primary | ICD-10-CM

## 2021-11-11 PROCEDURE — G8399 PT W/DXA RESULTS DOCUMENT: HCPCS | Performed by: INTERNAL MEDICINE

## 2021-11-11 PROCEDURE — G8427 DOCREV CUR MEDS BY ELIG CLIN: HCPCS | Performed by: INTERNAL MEDICINE

## 2021-11-11 PROCEDURE — G8536 NO DOC ELDER MAL SCRN: HCPCS | Performed by: INTERNAL MEDICINE

## 2021-11-11 PROCEDURE — 99213 OFFICE O/P EST LOW 20 MIN: CPT | Performed by: INTERNAL MEDICINE

## 2021-11-11 PROCEDURE — G8753 SYS BP > OR = 140: HCPCS | Performed by: INTERNAL MEDICINE

## 2021-11-11 PROCEDURE — G8419 CALC BMI OUT NRM PARAM NOF/U: HCPCS | Performed by: INTERNAL MEDICINE

## 2021-11-11 PROCEDURE — 1090F PRES/ABSN URINE INCON ASSESS: CPT | Performed by: INTERNAL MEDICINE

## 2021-11-11 PROCEDURE — G8432 DEP SCR NOT DOC, RNG: HCPCS | Performed by: INTERNAL MEDICINE

## 2021-11-11 PROCEDURE — 1101F PT FALLS ASSESS-DOCD LE1/YR: CPT | Performed by: INTERNAL MEDICINE

## 2021-11-11 PROCEDURE — G8754 DIAS BP LESS 90: HCPCS | Performed by: INTERNAL MEDICINE

## 2021-11-11 RX ORDER — CEFDINIR 300 MG/1
300 CAPSULE ORAL 2 TIMES DAILY
Qty: 10 CAPSULE | Refills: 0 | Status: SHIPPED | OUTPATIENT
Start: 2021-11-11 | End: 2021-11-16

## 2021-11-11 NOTE — PROGRESS NOTES
Chief Complaint   Patient presents with    Urinary Frequency     1. Have you been to the ER, urgent care clinic since your last visit? Hospitalized since your last visit? No    2. Have you seen or consulted any other health care providers outside of the [address] [place] [address] since your last visit? Include any pap smears or colon screening.  No     Visit Vitals  BP (!) 156/60   Pulse 70   Temp 98.3 °F (36.8 °C) (Temporal)   Resp 20   Ht 5' 5\" (1.651 m)   Wt 173 lb (78.5 kg)   SpO2 98%   BMI 28.79 kg/m²

## 2021-11-11 NOTE — PROGRESS NOTES
Rachna Hunt is a 68 y.o. female  Chief Complaint   Patient presents with    Urinary Frequency     Visit Vitals  BP (!) 156/60   Pulse 70   Temp 98.3 °F (36.8 °C) (Temporal)   Resp 20   Ht 5' 5\" (1.651 m)   Wt 173 lb (78.5 kg)   SpO2 98%   BMI 28.79 kg/m²      Health Maintenance Due   Topic Date Due    Shingrix Vaccine Age 49> (1 of 2) Never done    Eye Exam Retinal or Dilated  05/22/2021    Flu Vaccine (1) 09/01/2021    COVID-19 Vaccine (3 - Booster for Moderna series) 10/15/2021       HPI    Buffy Pimentel is a 74-year-old female who presents to clinic with a complaint of urinary frequency, urgency and dysuria for about 3-4 days. She was hopping it resolves on its own. Denies fever, chills, abdominal pain. Her blood pressure is elevated today but she indicates it is normal at home and her blood pressure gets elevated in office all the time. Allergies   Allergen Reactions    Ace Inhibitors Nausea Only          ROS  Review of Systems   All other systems reviewed and are negative. EXAM  Physical Exam  Constitutional:       General: She is not in acute distress. Appearance: Normal appearance. HENT:      Head: Normocephalic and atraumatic. Mouth/Throat:      Mouth: Mucous membranes are moist.      Pharynx: Oropharynx is clear. Eyes:      Extraocular Movements: Extraocular movements intact. Cardiovascular:      Rate and Rhythm: Normal rate and regular rhythm. Heart sounds: No murmur heard. No gallop. Pulmonary:      Effort: No respiratory distress. Breath sounds: Normal breath sounds. No wheezing or rales. Abdominal:      General: Bowel sounds are normal. There is no distension. Palpations: There is no mass. Tenderness: There is no abdominal tenderness. There is no guarding. Musculoskeletal:         General: No swelling, tenderness or deformity. Normal range of motion. Right lower leg: No edema. Left lower leg: No edema.    Skin: Findings: No lesion. Neurological:      General: No focal deficit present. Mental Status: She is alert. Motor: No weakness. Psychiatric:         Mood and Affect: Mood normal.         ASSESSMENT/PLAN  Encounter Diagnoses     ICD-10-CM ICD-9-CM   1.  Acute cystitis without hematuria  N30.00 595.0      Orders Placed This Encounter    cefdinir (OMNICEF) 300 mg capsule       Neda Dodge MD

## 2021-11-15 LAB — BACTERIA UR CULT: ABNORMAL

## 2022-01-04 DIAGNOSIS — E11.9 DIABETES MELLITUS WITHOUT COMPLICATION (HCC): ICD-10-CM

## 2022-01-04 RX ORDER — METFORMIN HYDROCHLORIDE 500 MG/1
500 TABLET, EXTENDED RELEASE ORAL
Qty: 90 TABLET | Refills: 0 | Status: SHIPPED | OUTPATIENT
Start: 2022-01-04 | End: 2022-01-28

## 2022-01-04 RX ORDER — ISOPROPYL ALCOHOL 70 ML/100ML
1 SWAB TOPICAL 2 TIMES DAILY
Qty: 200 PAD | Refills: 0 | Status: SHIPPED | OUTPATIENT
Start: 2022-01-04 | End: 2022-05-24 | Stop reason: SDUPTHER

## 2022-01-04 NOTE — TELEPHONE ENCOUNTER
Future Appointments:  Future Appointments   Date Time Provider Betsy Morenoi   2022  9:15 AM YONNY Lopez BS AMB        Last Appointment With Me:  2021     Requested Prescriptions     Pending Prescriptions Disp Refills    metFORMIN ER (GLUCOPHAGE XR) 500 mg tablet 90 Tablet 1     Sig: Take 1 Tablet by mouth daily (with dinner).  alcohol swabs (BD Single Use Swabs Regular) padm 200 Pad 3     Si Each by Apply Externally route two (2) times a day.  E11.9

## 2022-02-04 RX ORDER — BLOOD SUGAR DIAGNOSTIC
STRIP MISCELLANEOUS
Qty: 200 STRIP | Refills: 3 | Status: SHIPPED | OUTPATIENT
Start: 2022-02-04 | End: 2022-05-26 | Stop reason: SDUPTHER

## 2022-03-16 DIAGNOSIS — E11.9 DIABETES MELLITUS WITHOUT COMPLICATION (HCC): ICD-10-CM

## 2022-03-16 DIAGNOSIS — I10 HYPERTENSION, UNSPECIFIED TYPE: ICD-10-CM

## 2022-03-16 RX ORDER — ATORVASTATIN CALCIUM 80 MG/1
TABLET, FILM COATED ORAL
Qty: 90 TABLET | Refills: 1 | Status: SHIPPED | OUTPATIENT
Start: 2022-03-16 | End: 2022-11-02

## 2022-03-16 RX ORDER — METFORMIN HYDROCHLORIDE 500 MG/1
TABLET, EXTENDED RELEASE ORAL
Qty: 15 TABLET | Refills: 0 | Status: SHIPPED | OUTPATIENT
Start: 2022-03-16 | End: 2022-08-31 | Stop reason: SDUPTHER

## 2022-03-16 RX ORDER — AMLODIPINE BESYLATE 5 MG/1
TABLET ORAL
Qty: 15 TABLET | Refills: 0 | Status: SHIPPED | OUTPATIENT
Start: 2022-03-16 | End: 2022-04-05

## 2022-03-18 PROBLEM — Z71.89 LIVING WILL, COUNSELING/DISCUSSION: Status: ACTIVE | Noted: 2017-06-16

## 2022-03-18 PROBLEM — M85.80 OSTEOPENIA: Status: ACTIVE | Noted: 2017-02-15

## 2022-03-19 PROBLEM — E78.00 PURE HYPERCHOLESTEROLEMIA: Status: ACTIVE | Noted: 2017-02-15

## 2022-03-19 PROBLEM — R03.0 WHITE COAT SYNDROME WITHOUT HYPERTENSION: Status: ACTIVE | Noted: 2017-05-17

## 2022-04-05 ENCOUNTER — OFFICE VISIT (OUTPATIENT)
Dept: INTERNAL MEDICINE CLINIC | Age: 82
End: 2022-04-05
Payer: MEDICARE

## 2022-04-05 VITALS
RESPIRATION RATE: 20 BRPM | DIASTOLIC BLOOD PRESSURE: 63 MMHG | HEART RATE: 71 BPM | SYSTOLIC BLOOD PRESSURE: 134 MMHG | OXYGEN SATURATION: 99 % | WEIGHT: 174 LBS | HEIGHT: 65 IN | TEMPERATURE: 98.2 F | BODY MASS INDEX: 28.99 KG/M2

## 2022-04-05 DIAGNOSIS — E78.00 PURE HYPERCHOLESTEROLEMIA: ICD-10-CM

## 2022-04-05 DIAGNOSIS — E11.9 DIABETES MELLITUS WITHOUT COMPLICATION (HCC): Primary | ICD-10-CM

## 2022-04-05 DIAGNOSIS — I10 ESSENTIAL HYPERTENSION: ICD-10-CM

## 2022-04-05 PROCEDURE — G8432 DEP SCR NOT DOC, RNG: HCPCS | Performed by: PHYSICIAN ASSISTANT

## 2022-04-05 PROCEDURE — G8399 PT W/DXA RESULTS DOCUMENT: HCPCS | Performed by: PHYSICIAN ASSISTANT

## 2022-04-05 PROCEDURE — G8419 CALC BMI OUT NRM PARAM NOF/U: HCPCS | Performed by: PHYSICIAN ASSISTANT

## 2022-04-05 PROCEDURE — 1090F PRES/ABSN URINE INCON ASSESS: CPT | Performed by: PHYSICIAN ASSISTANT

## 2022-04-05 PROCEDURE — 99214 OFFICE O/P EST MOD 30 MIN: CPT | Performed by: PHYSICIAN ASSISTANT

## 2022-04-05 PROCEDURE — G8427 DOCREV CUR MEDS BY ELIG CLIN: HCPCS | Performed by: PHYSICIAN ASSISTANT

## 2022-04-05 PROCEDURE — G8536 NO DOC ELDER MAL SCRN: HCPCS | Performed by: PHYSICIAN ASSISTANT

## 2022-04-05 PROCEDURE — G8752 SYS BP LESS 140: HCPCS | Performed by: PHYSICIAN ASSISTANT

## 2022-04-05 PROCEDURE — 1101F PT FALLS ASSESS-DOCD LE1/YR: CPT | Performed by: PHYSICIAN ASSISTANT

## 2022-04-05 PROCEDURE — G8754 DIAS BP LESS 90: HCPCS | Performed by: PHYSICIAN ASSISTANT

## 2022-04-05 NOTE — PROGRESS NOTES
Carlos Eduardo Bridges is a 80 y.o. female  Chief Complaint   Patient presents with    Medication Evaluation    Medication Refill     Visit Vitals  /63   Pulse 71   Temp 98.2 °F (36.8 °C) (Temporal)   Resp 20   Ht 5' 5\" (1.651 m)   Wt 174 lb (78.9 kg)   SpO2 99%   BMI 28.96 kg/m²      Health Maintenance Due   Topic Date Due    Shingrix Vaccine Age 49> (1 of 2) Never done    Eye Exam Retinal or Dilated  05/22/2021    COVID-19 Vaccine (3 - Booster for Moderna series) 09/15/2021    A1C test (Diabetic or Prediabetic)  01/07/2022     HPI  DM II - controlled at last check. Overdue for recheck. Lab Results   Component Value Date/Time    Hemoglobin A1c 6.2 (H) 07/07/2021 11:01 AM    Hemoglobin A1c 6.2 (H) 08/17/2020 09:46 AM    Hemoglobin A1c 6.6 (H) 02/12/2020 10:47 AM    Glucose 129 (H) 07/07/2021 11:01 AM    Microalbumin/Creat ratio (mg/g creat) 19 07/07/2021 11:01 AM    Microalbumin,urine random 2.01 07/07/2021 11:01 AM    LDL, calculated 98.8 07/07/2021 11:01 AM    Creatinine 0.80 07/07/2021 11:01 AM     Currently taking:   Key Antihyperglycemic Medications             metFORMIN ER (GLUCOPHAGE XR) 500 mg tablet (Taking) TAKE 1 TABLET EVERY DAY WITH DINNER. PATIENT IS OVERDUE FOR FOLLOW UP.    insulin NPH/insulin regular (NovoLIN 70/30 U-100 Insulin) 100 unit/mL (70-30) injection (Taking) INJECT 40 UNITS UNDER THE SKIN EVERY MORNING AND 20 UNITS EVERY EVENING        Weight is stable. Wt Readings from Last 3 Encounters:   04/05/22 174 lb (78.9 kg)   11/11/21 173 lb (78.5 kg)   07/21/21 175 lb (79.4 kg)     HTN Follow up - BP controlled:  BP Readings from Last 3 Encounters:   04/05/22 134/63   11/11/21 (!) 156/60   07/21/21 138/60     Currently taking:   Key CAD CHF Meds             atorvastatin (LIPITOR) 80 mg tablet (Taking) TAKE 1 TABLET EVERY DAY    amLODIPine (NORVASC) 10 mg tablet (Taking) Take 1 Tablet by mouth daily.     losartan (COZAAR) 100 mg tablet (Taking) TAKE 1 TABLET BY MOUTH DAILY        Lab Results   Component Value Date/Time    Creatinine 0.80 07/07/2021 11:01 AM     ROS  Review of Systems   Respiratory: Negative for shortness of breath. Cardiovascular: Negative for chest pain and palpitations. Neurological: Negative for dizziness, loss of consciousness and weakness. Psychiatric/Behavioral: Negative for depression. EXAM  Physical Exam  Vitals and nursing note reviewed. Constitutional:       General: She is not in acute distress. Appearance: She is well-developed. HENT:      Head: Normocephalic and atraumatic. Neck:      Vascular: No JVD. Cardiovascular:      Rate and Rhythm: Normal rate and regular rhythm. Heart sounds: Normal heart sounds. Pulmonary:      Effort: Pulmonary effort is normal. No respiratory distress. Breath sounds: Normal breath sounds. Musculoskeletal:      Cervical back: Neck supple. Skin:     General: Skin is warm and dry. Neurological:      Mental Status: She is alert and oriented to person, place, and time. Psychiatric:         Mood and Affect: Mood normal.         Behavior: Behavior normal.         Thought Content: Thought content normal.         Judgment: Judgment normal.       ASSESSMENT/PLAN  Encounter Diagnoses     ICD-10-CM ICD-9-CM   1. Diabetes mellitus without complication (HCC)  H00.9 250.00   2. Essential hypertension  I10 401.9   3. Pure hypercholesterolemia  E78.00 272.0     Orders Placed This Encounter    MICROALBUMIN, UR, RAND W/ MICROALB/CREAT RATIO    LIPID PANEL    HEMOGLOBIN A1C WITH EAG    METABOLIC PANEL, COMPREHENSIVE    insulin NPH/insulin regular (NovoLIN 70/30 U-100 Insulin) 100 unit/mL (70-30) injection   Refill as above.

## 2022-04-05 NOTE — PROGRESS NOTES
1. \"Have you been to the ER, urgent care clinic since your last visit? Hospitalized since your last visit? \" No    2. \"Have you seen or consulted any other health care providers outside of the [address] [place] [address] since your last visit? \" No    3. For patients aged 39-70: Has the patient had a colonoscopy / FIT/ Cologuard? No      If the patient is female:    4. For patients aged 41-77: Has the patient had a mammogram within the past 2 years? N      5. For patients aged 21-65: Has the patient had a pap smear? N    Health Maintenance Due   Topic Date Due    Shingrix Vaccine Age 49> (1 of 2) Never done    Eye Exam Retinal or Dilated  05/22/2021    COVID-19 Vaccine (3 - Booster for Moderna series) 09/15/2021    A1C test (Diabetic or Prediabetic)  01/07/2022     Patient here today for medication refills.

## 2022-04-16 DIAGNOSIS — I10 HYPERTENSION, UNSPECIFIED TYPE: ICD-10-CM

## 2022-04-18 RX ORDER — AMLODIPINE BESYLATE 10 MG/1
10 TABLET ORAL DAILY
Qty: 90 TABLET | Refills: 1 | Status: SHIPPED | OUTPATIENT
Start: 2022-04-18 | End: 2022-05-24 | Stop reason: SDUPTHER

## 2022-05-24 DIAGNOSIS — E11.9 DIABETES MELLITUS WITHOUT COMPLICATION (HCC): ICD-10-CM

## 2022-05-24 DIAGNOSIS — I10 HYPERTENSION, UNSPECIFIED TYPE: ICD-10-CM

## 2022-05-24 RX ORDER — ISOPROPYL ALCOHOL 70 ML/100ML
1 SWAB TOPICAL 2 TIMES DAILY
Qty: 200 PAD | Refills: 0 | Status: SHIPPED | OUTPATIENT
Start: 2022-05-24

## 2022-05-24 RX ORDER — BLOOD-GLUCOSE METER
1 EACH MISCELLANEOUS 2 TIMES DAILY
Qty: 1 EACH | Refills: 1 | Status: SHIPPED | OUTPATIENT
Start: 2022-05-24

## 2022-05-24 RX ORDER — BLOOD SUGAR DIAGNOSTIC
STRIP MISCELLANEOUS
Qty: 200 STRIP | Refills: 3 | Status: CANCELLED | OUTPATIENT
Start: 2022-05-24

## 2022-05-24 RX ORDER — AMLODIPINE BESYLATE 10 MG/1
10 TABLET ORAL DAILY
Qty: 90 TABLET | Refills: 1 | Status: SHIPPED | OUTPATIENT
Start: 2022-05-24 | End: 2022-10-19

## 2022-05-24 NOTE — TELEPHONE ENCOUNTER
NOT ON PATIENT'S MED LIST; please order    - [employer] TRUE METRIX TEST STRIP    - TRUE METRIX LEVEL 1 CTRL SOLN

## 2022-05-24 NOTE — TELEPHONE ENCOUNTER
Future Appointments:  Future Appointments   Date Time Provider Betsy Morenoi   2022  9:45 AM YONNY Brooks BS AMB        Last Appointment With Me:  2022     Requested Prescriptions     Pending Prescriptions Disp Refills    amLODIPine (NORVASC) 10 mg tablet 90 Tablet 1     Sig: Take 1 Tablet by mouth daily. Blood-Glucose Meter (Accu-Chek Irene Plus Meter) misc 1 Each 1     Si Each by Does Not Apply route two (2) times a day. E11.9    alcohol swabs (BD Single Use Swabs Regular) padm 200 Pad 0     Si Each by Apply Externally route two (2) times a day.  E11.9

## 2022-08-22 ENCOUNTER — OFFICE VISIT (OUTPATIENT)
Dept: INTERNAL MEDICINE CLINIC | Age: 82
End: 2022-08-22
Payer: MEDICARE

## 2022-08-22 VITALS
TEMPERATURE: 98.2 F | OXYGEN SATURATION: 97 % | HEIGHT: 65 IN | BODY MASS INDEX: 29.22 KG/M2 | RESPIRATION RATE: 18 BRPM | WEIGHT: 175.4 LBS | DIASTOLIC BLOOD PRESSURE: 55 MMHG | SYSTOLIC BLOOD PRESSURE: 124 MMHG | HEART RATE: 67 BPM

## 2022-08-22 DIAGNOSIS — E78.00 PURE HYPERCHOLESTEROLEMIA: ICD-10-CM

## 2022-08-22 DIAGNOSIS — Z79.4 TYPE 2 DIABETES MELLITUS WITH MICROALBUMINURIA, WITH LONG-TERM CURRENT USE OF INSULIN (HCC): ICD-10-CM

## 2022-08-22 DIAGNOSIS — E11.29 TYPE 2 DIABETES MELLITUS WITH MICROALBUMINURIA, WITH LONG-TERM CURRENT USE OF INSULIN (HCC): ICD-10-CM

## 2022-08-22 DIAGNOSIS — R80.9 TYPE 2 DIABETES MELLITUS WITH MICROALBUMINURIA, WITH LONG-TERM CURRENT USE OF INSULIN (HCC): ICD-10-CM

## 2022-08-22 DIAGNOSIS — Z00.00 ENCOUNTER FOR MEDICARE ANNUAL WELLNESS EXAM: Primary | ICD-10-CM

## 2022-08-22 DIAGNOSIS — I10 ESSENTIAL HYPERTENSION: ICD-10-CM

## 2022-08-22 DIAGNOSIS — F41.9 ANXIETY: ICD-10-CM

## 2022-08-22 PROBLEM — E11.22 TYPE 2 DIABETES MELLITUS WITH CHRONIC KIDNEY DISEASE (HCC): Status: ACTIVE | Noted: 2022-08-22

## 2022-08-22 PROBLEM — N18.30 CHRONIC RENAL DISEASE, STAGE III (HCC): Status: ACTIVE | Noted: 2022-08-22

## 2022-08-22 PROCEDURE — G8536 NO DOC ELDER MAL SCRN: HCPCS | Performed by: PHYSICIAN ASSISTANT

## 2022-08-22 PROCEDURE — G8419 CALC BMI OUT NRM PARAM NOF/U: HCPCS | Performed by: PHYSICIAN ASSISTANT

## 2022-08-22 PROCEDURE — 1090F PRES/ABSN URINE INCON ASSESS: CPT | Performed by: PHYSICIAN ASSISTANT

## 2022-08-22 PROCEDURE — G0439 PPPS, SUBSEQ VISIT: HCPCS | Performed by: PHYSICIAN ASSISTANT

## 2022-08-22 PROCEDURE — 99213 OFFICE O/P EST LOW 20 MIN: CPT | Performed by: PHYSICIAN ASSISTANT

## 2022-08-22 PROCEDURE — G8399 PT W/DXA RESULTS DOCUMENT: HCPCS | Performed by: PHYSICIAN ASSISTANT

## 2022-08-22 PROCEDURE — G8754 DIAS BP LESS 90: HCPCS | Performed by: PHYSICIAN ASSISTANT

## 2022-08-22 PROCEDURE — 1101F PT FALLS ASSESS-DOCD LE1/YR: CPT | Performed by: PHYSICIAN ASSISTANT

## 2022-08-22 PROCEDURE — G8432 DEP SCR NOT DOC, RNG: HCPCS | Performed by: PHYSICIAN ASSISTANT

## 2022-08-22 PROCEDURE — G8427 DOCREV CUR MEDS BY ELIG CLIN: HCPCS | Performed by: PHYSICIAN ASSISTANT

## 2022-08-22 PROCEDURE — G8752 SYS BP LESS 140: HCPCS | Performed by: PHYSICIAN ASSISTANT

## 2022-08-22 NOTE — PROGRESS NOTES
Power Vicente is a 80 y.o. female  Chief Complaint   Patient presents with    Follow-up     Diabetes      Visit Vitals  BP (!) 124/55 (BP 1 Location: Left arm, BP Patient Position: Sitting, BP Cuff Size: Adult long)   Pulse 67   Temp 98.2 °F (36.8 °C) (Temporal)   Resp 18   Ht 5' 5\" (1.651 m)   Wt 175 lb 6.4 oz (79.6 kg)   SpO2 97%   BMI 29.19 kg/m²      Health Maintenance Due   Topic Date Due    Shingrix Vaccine Age 49> (1 of 2) Never done    Eye Exam Retinal or Dilated  05/22/2021    COVID-19 Vaccine (4 - Booster for Moderna series) 03/27/2022    Medicare Yearly Exam  07/22/2022       HPI  DM II - controlled at last check, but over-controlled for age. Lab Results   Component Value Date/Time    Hemoglobin A1c 6.1 (H) 04/05/2022 10:35 AM    Hemoglobin A1c 6.2 (H) 07/07/2021 11:01 AM    Hemoglobin A1c 6.2 (H) 08/17/2020 09:46 AM    Glucose 119 (H) 04/05/2022 10:35 AM    Microalbumin/Creat ratio (mg/g creat) 31 (H) 04/05/2022 10:35 AM    Microalbumin,urine random 2.61 04/05/2022 10:35 AM    LDL, calculated 65 04/05/2022 10:35 AM    Creatinine 0.97 04/05/2022 10:35 AM     Currently taking:   Key Antihyperglycemic Medications               insulin NPH/insulin regular (NovoLIN 70/30 U-100 Insulin) 100 unit/mL (70-30) injection (Taking) INJECT 40 UNITS UNDER THE SKIN EVERY MORNING    metFORMIN ER (GLUCOPHAGE XR) 500 mg tablet (Taking) TAKE 1 TABLET EVERY DAY WITH DINNER. PATIENT IS OVERDUE FOR FOLLOW UP. Having hypoglycemic events in AM when going out for late lunches. Weight is stable. Wt Readings from Last 3 Encounters:   08/22/22 175 lb 6.4 oz (79.6 kg)   04/05/22 174 lb (78.9 kg)   11/11/21 173 lb (78.5 kg)     Feeling down.  is not supportive and is somewhat verbally but not physically abusive. Has good support of friends. ROS  Review of Systems   Constitutional:  Negative for fever and weight loss. Respiratory:  Negative for shortness of breath.     Cardiovascular:  Negative for chest pain and palpitations. Neurological:  Negative for dizziness, loss of consciousness and weakness. Psychiatric/Behavioral:  Positive for depression. Negative for substance abuse and suicidal ideas. The patient is nervous/anxious. EXAM  Physical Exam  Vitals and nursing note reviewed. Constitutional:       General: She is not in acute distress. Appearance: She is well-developed. HENT:      Head: Normocephalic and atraumatic. Neck:      Vascular: No JVD. Cardiovascular:      Rate and Rhythm: Normal rate and regular rhythm. Heart sounds: Normal heart sounds. Pulmonary:      Effort: Pulmonary effort is normal. No respiratory distress. Breath sounds: Normal breath sounds. Musculoskeletal:      Cervical back: Neck supple. Skin:     General: Skin is warm and dry. Neurological:      Mental Status: She is alert and oriented to person, place, and time. Psychiatric:         Mood and Affect: Mood normal.         Behavior: Behavior normal.         Thought Content: Thought content normal.         Judgment: Judgment normal.     ASSESSMENT/PLAN  Encounter Diagnoses     ICD-10-CM ICD-9-CM   1. Encounter for Medicare annual wellness exam  Z00.00 V70.0   2. Essential hypertension  I10 401.9   3. Pure hypercholesterolemia  E78.00 272.0   4. Type 2 diabetes mellitus with microalbuminuria, with long-term current use of insulin ([facility])  E11.29 250.40    R80.9 791.0    Z79.4 V58.67   5. Diabetes mellitus without complication ([facility])  Q77.1 250.00   6. Anxiety  F41.9 300.00     Orders Placed This Encounter    METABOLIC PANEL, COMPREHENSIVE    HEMOGLOBIN A1C WITH EAG    LIPID PANEL    MICROALBUMIN, UR, RAND W/ MICROALB/CREAT RATIO    REFERRAL TO SOCIAL WORK   Declines antidepressant. Doesn't want to change tx but she runs out of Insulin due to cost. Pt agrees to call when she's ready for me to send Rx. Will change to Lantus  Lower to 30 units in AM and 20 units at night.  Continue lowering until no longer having hypoglycemia.

## 2022-08-22 NOTE — PROGRESS NOTES
1. Have you been to the ER, urgent care clinic since your last visit? Hospitalized since your last visit? No    2. Have you seen or consulted any other health care providers outside of the [address] [place] [address] since your last visit? Include any pap smears or colon screening.  No    Chief Complaint   Patient presents with    Follow-up     Diabetes

## 2022-08-22 NOTE — PROGRESS NOTES
This is the Subsequent Medicare Annual Wellness Exam, performed 12 months or more after the Initial AWV or the last Subsequent AWV  Health Maintenance Due   Topic Date Due    Shingrix Vaccine Age 49> (1 of 2) Never done    Eye Exam Retinal or Dilated  05/22/2021    COVID-19 Vaccine (4 - Booster for Chato Daunt series) 03/27/2022    Medicare Yearly Exam  07/22/2022     Depression Risk Factor Screening:     3 most recent PHQ Screens 8/22/2022   Little interest or pleasure in doing things Several days   Feeling down, depressed, irritable, or hopeless Not at all   Total Score PHQ 2 1       Abuse Screen  Patient is not abused    Fall Risk  Fall Risk Assessment, last 12 mths 8/22/2022   Able to walk? Yes   Fall in past 12 months? 0   Do you feel unsteady? 1   Are you worried about falling 1   Is TUG test greater than 12 seconds? 0   Is the gait abnormal? 0   Number of falls in past 12 months -   Fall with injury? -       Alcohol Risk Factor Screening:    reports no history of alcohol use. Functional Ability and Level of Safety:   Hearing Loss  Hearing is good. Activities of Daily Living  The home contains: handrails  Patient does total self care     Cognitive Screening   Evaluation of Cognitive Function:  Has your family/caregiver stated any concerns about your memory: no     Patient Care Team   Patient Care Team:  Simon Roblero as PCP - General (Physician Assistant)  Siddhartha Patrick PA-C as PCP - REHABILITATION [facility] Empaneled Provider  Marce Randall MD (Ophthalmology)    Assessment/Plan   Education and counseling provided:  Are appropriate based on today's review and evaluation    Extended Emergency Contact Information  Primary Emergency Contact:  [employer] [facility] Phone: [phone]  Mobile Phone: [phone]  Relation: Child  Secondary Emergency Contact: [place] Phone: [phone]  Relation: Spouse    Advance Care Planning (ACP) Provider Note - Comprehensive     Date of ACP Conversation: 08/22/22  Persons included in Conversation:  patient  Length of ACP Conversation in minutes:  <16 minutes (Non-Billable)    Authorized Decision Maker (if patient is incapable of making informed decisions): This person is:  Extended Emergency Contact Information  Primary Emergency Contact: [address] Phone: [phone]  Mobile Phone: [phone]  Relation: Child  Secondary Emergency Contact: Marv Phone: [phone]  Relation: Spouse          General ACP for ALL Patients with Decision Making Capacity:   Importance of advance care planning, including choosing a healthcare agent to communicate patient's healthcare decisions if patient lost the ability to make decisions, such as after a sudden illness or accident  Understanding of the healthcare agent role was assessed and information provided  Exploration of values, goals, and preferences if recovery is not expected, even with continued medical treatment in the event of: Imminent death  Severe, permanent brain injury  \"In these circumstances, what matters most to you? \"  Care focused more on comfort or quality of life. Review of Existing Advance Directive:  None - form given today    For Serious or Chronic Illness:  Understanding of medical condition      Interventions Provided:  Recommended completion of Advance Directive form after review of ACP materials and conversation with prospective healthcare agent   Recommended communicating the plan and making copies for the healthcare agent, personal physician, and others as appropriate (e.g., health system)  Recommended review of completed ACP document annually or upon change in health status      I have reviewed the patient's medical history in detail and updated the computerized patient record.      History     Past Medical History:   Diagnosis Date    Diabetes ([address]     Fatty liver     GERD (gastroesophageal reflux disease) Hypercholesterolemia     Osteopenia 2010    White coat hypertension       Past Surgical History:   Procedure Laterality Date    HX CATARACT REMOVAL Bilateral 8/2012    HX COLONOSCOPY  2011    HX NA AND BSO  1987     Current Outpatient Medications   Medication Sig Dispense Refill    lancets (Accu-Chek Softclix Lancets) misc Test BID. E11.9 200 Each 3    glucose blood VI test strips (Accu-Chek Irene Plus test strp) strip Check BS bid 200 Strip 3    amLODIPine (NORVASC) 10 mg tablet Take 1 Tablet by mouth daily. 90 Tablet 1    Blood-Glucose Meter (Accu-Chek Irene Plus Meter) misc 1 Each by Does Not Apply route two (2) times a day. E11.9 1 Each 1    alcohol swabs (BD Single Use Swabs Regular) padm 1 Each by Apply Externally route two (2) times a day. E11.9 200 Pad 0    insulin NPH/insulin regular (NovoLIN 70/30 U-100 Insulin) 100 unit/mL (70-30) injection INJECT 40 UNITS UNDER THE SKIN EVERY MORNING 6 Each 1    atorvastatin (LIPITOR) 80 mg tablet TAKE 1 TABLET EVERY DAY 90 Tablet 1    metFORMIN ER (GLUCOPHAGE XR) 500 mg tablet TAKE 1 TABLET EVERY DAY WITH DINNER. PATIENT IS OVERDUE FOR FOLLOW UP. 15 Tablet 0    Droplet Insulin Syr,half unit, 0.5 mL 31 gauge x 15/64\" syrg USE TWICE DAILY  WITH  INSULIN 200 Syringe 3    triamcinolone acetonide (KENALOG) 0.1 % ointment Apply  to affected area two (2) times a day. use thin layer (Patient taking differently: Apply  to affected area two (2) times daily as needed. use thin layer) 60 g 1    cetirizine (ZYRTEC) 10 mg tablet Take 1 Tablet by mouth nightly. Indications: a type of allergy that causes red and itchy skin called atopic dermatitis, hives      hydrocortisone (CORTAID) 1 % topical cream Apply  to affected area two (2) times daily as needed. use thin layer on Right face/eyelid. 30 g 0    insulin syringe-needle U-100 0.3 mL 31 gauge x 15/64\" syrg Use BID with insulin.  Dx E11.9. 180 Pen Needle 3    insulin U-500 syringe-needle (BD Insulin Syringe U-500) 1/2 mL 31 gauge x 15/64\" syrg Use twice daily with insulin. 180 Syringe 3    losartan (COZAAR) 100 mg tablet TAKE 1 TABLET BY MOUTH DAILY 90 Tab 1    Blood Glucose Control High&Low (ACCU-CHEK NY CONTROL SOLN) soln Test meter once weekly. E11.9 1 Bottle 1    MULTIVITAMIN W-MINERALS/LUTEIN (CENTRUM SILVER PO) Take  by mouth.  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

## 2022-08-23 PROBLEM — F41.9 ANXIETY: Status: ACTIVE | Noted: 2022-08-23

## 2022-08-23 LAB
ALBUMIN SERPL-MCNC: 4.1 G/DL (ref 3.5–5)
ALBUMIN/GLOB SERPL: 1.2 {RATIO} (ref 1.1–2.2)
ALP SERPL-CCNC: 102 U/L (ref 45–117)
ALT SERPL-CCNC: 23 U/L (ref 12–78)
ANION GAP SERPL CALC-SCNC: 6 MMOL/L (ref 5–15)
AST SERPL-CCNC: 15 U/L (ref 15–37)
BILIRUB SERPL-MCNC: 0.8 MG/DL (ref 0.2–1)
BUN SERPL-MCNC: 16 MG/DL (ref 6–20)
BUN/CREAT SERPL: 15 (ref 12–20)
CALCIUM SERPL-MCNC: 9.6 MG/DL (ref 8.5–10.1)
CHLORIDE SERPL-SCNC: 106 MMOL/L (ref 97–108)
CHOLEST SERPL-MCNC: 159 MG/DL
CO2 SERPL-SCNC: 27 MMOL/L (ref 21–32)
CREAT SERPL-MCNC: 1.06 MG/DL (ref 0.55–1.02)
CREAT UR-MCNC: 133 MG/DL
EST. AVERAGE GLUCOSE BLD GHB EST-MCNC: 131 MG/DL
GLOBULIN SER CALC-MCNC: 3.4 G/DL (ref 2–4)
GLUCOSE SERPL-MCNC: 122 MG/DL (ref 65–100)
HBA1C MFR BLD: 6.2 % (ref 4–5.6)
HDLC SERPL-MCNC: 62 MG/DL
HDLC SERPL: 2.6 {RATIO} (ref 0–5)
LDLC SERPL CALC-MCNC: 59.6 MG/DL (ref 0–100)
MICROALBUMIN UR-MCNC: 2.58 MG/DL
MICROALBUMIN/CREAT UR-RTO: 19 MG/G (ref 0–30)
POTASSIUM SERPL-SCNC: 4.6 MMOL/L (ref 3.5–5.1)
PROT SERPL-MCNC: 7.5 G/DL (ref 6.4–8.2)
SODIUM SERPL-SCNC: 139 MMOL/L (ref 136–145)
TRIGL SERPL-MCNC: 187 MG/DL (ref ?–150)
VLDLC SERPL CALC-MCNC: 37.4 MG/DL

## 2022-08-31 DIAGNOSIS — E11.9 DIABETES MELLITUS WITHOUT COMPLICATION (HCC): ICD-10-CM

## 2022-08-31 RX ORDER — METFORMIN HYDROCHLORIDE 500 MG/1
TABLET, EXTENDED RELEASE ORAL
Qty: 90 TABLET | Refills: 1 | Status: SHIPPED | OUTPATIENT
Start: 2022-08-31

## 2022-10-03 ENCOUNTER — OFFICE VISIT (OUTPATIENT)
Dept: INTERNAL MEDICINE CLINIC | Age: 82
End: 2022-10-03
Payer: MEDICARE

## 2022-10-03 VITALS
DIASTOLIC BLOOD PRESSURE: 80 MMHG | SYSTOLIC BLOOD PRESSURE: 148 MMHG | WEIGHT: 179.2 LBS | HEIGHT: 65 IN | BODY MASS INDEX: 29.85 KG/M2 | HEART RATE: 87 BPM | OXYGEN SATURATION: 97 % | RESPIRATION RATE: 18 BRPM

## 2022-10-03 DIAGNOSIS — I10 HYPERTENSION, UNSPECIFIED TYPE: ICD-10-CM

## 2022-10-03 DIAGNOSIS — I10 ESSENTIAL HYPERTENSION: ICD-10-CM

## 2022-10-03 DIAGNOSIS — R39.9 UTI SYMPTOMS: Primary | ICD-10-CM

## 2022-10-03 DIAGNOSIS — N30.00 ACUTE CYSTITIS WITHOUT HEMATURIA: ICD-10-CM

## 2022-10-03 PROCEDURE — 99214 OFFICE O/P EST MOD 30 MIN: CPT | Performed by: PHYSICIAN ASSISTANT

## 2022-10-03 PROCEDURE — G8753 SYS BP > OR = 140: HCPCS | Performed by: PHYSICIAN ASSISTANT

## 2022-10-03 PROCEDURE — G8417 CALC BMI ABV UP PARAM F/U: HCPCS | Performed by: PHYSICIAN ASSISTANT

## 2022-10-03 PROCEDURE — 1090F PRES/ABSN URINE INCON ASSESS: CPT | Performed by: PHYSICIAN ASSISTANT

## 2022-10-03 PROCEDURE — G8754 DIAS BP LESS 90: HCPCS | Performed by: PHYSICIAN ASSISTANT

## 2022-10-03 PROCEDURE — G8399 PT W/DXA RESULTS DOCUMENT: HCPCS | Performed by: PHYSICIAN ASSISTANT

## 2022-10-03 PROCEDURE — G8536 NO DOC ELDER MAL SCRN: HCPCS | Performed by: PHYSICIAN ASSISTANT

## 2022-10-03 PROCEDURE — G8432 DEP SCR NOT DOC, RNG: HCPCS | Performed by: PHYSICIAN ASSISTANT

## 2022-10-03 PROCEDURE — 1101F PT FALLS ASSESS-DOCD LE1/YR: CPT | Performed by: PHYSICIAN ASSISTANT

## 2022-10-03 PROCEDURE — G8427 DOCREV CUR MEDS BY ELIG CLIN: HCPCS | Performed by: PHYSICIAN ASSISTANT

## 2022-10-03 RX ORDER — CHOLECALCIFEROL (VITAMIN D3) 50 MCG
CAPSULE ORAL
COMMUNITY

## 2022-10-03 RX ORDER — LOSARTAN POTASSIUM 25 MG/1
25 TABLET ORAL DAILY
Qty: 90 TABLET | Refills: 1 | Status: SHIPPED | OUTPATIENT
Start: 2022-10-03

## 2022-10-03 RX ORDER — PHENAZOPYRIDINE HYDROCHLORIDE 100 MG/1
100 TABLET, FILM COATED ORAL
Qty: 21 TABLET | Refills: 0 | Status: SHIPPED | OUTPATIENT
Start: 2022-10-03

## 2022-10-03 RX ORDER — NITROFURANTOIN 25; 75 MG/1; MG/1
100 CAPSULE ORAL 2 TIMES DAILY
Qty: 14 CAPSULE | Refills: 0 | Status: SHIPPED | OUTPATIENT
Start: 2022-10-03 | End: 2022-10-10

## 2022-10-03 NOTE — PROGRESS NOTES
Shorty Chambers is a 80 y.o. female  Chief Complaint   Patient presents with    Urinary Pain     Visit Vitals  BP (!) 143/53 (BP 1 Location: Left lower arm, BP Patient Position: Sitting, BP Cuff Size: Adult)   Pulse 87   Resp 18   Ht 5' 5\" (1.651 m)   Wt 179 lb 3.2 oz (81.3 kg)   SpO2 97%   BMI 29.82 kg/m²      Health Maintenance Due   Topic Date Due    Shingrix Vaccine Age 49> (1 of 2) Never done    Eye Exam Retinal or Dilated  05/22/2021    COVID-19 Vaccine (4 - Booster for Moderna series) 03/27/2022    Flu Vaccine (1) 08/01/2022       HPI  Urinary urgency, freq x 5 days. Last UTI 1 year ago. HTN Follow up - BP uncontrolled today:  BP Readings from Last 3 Encounters:   10/03/22 (!) 143/53   08/22/22 (!) 124/55   04/05/22 134/63     Currently taking:   Key CAD CHF Meds               omega 3-dha-epa-fish oil (Fish OiL) 100-160-1,000 mg cap (Taking) Take  by mouth. amLODIPine (NORVASC) 10 mg tablet (Taking) Take 1 Tablet by mouth daily. atorvastatin (LIPITOR) 80 mg tablet (Taking) TAKE 1 TABLET EVERY DAY          Lab Results   Component Value Date/Time    Creatinine 1.06 (H) 08/22/2022 10:53 AM     ROS  Review of Systems   Constitutional:  Negative for fever. Respiratory:  Negative for shortness of breath. Cardiovascular:  Negative for chest pain and palpitations. Gastrointestinal:  Negative for abdominal pain. Genitourinary:  Positive for dysuria, frequency and urgency. Negative for flank pain and hematuria. Denies vaginal discharge. Neurological:  Negative for dizziness, loss of consciousness and weakness. EXAM  Physical Exam  Vitals and nursing note reviewed. Constitutional:       General: She is not in acute distress. Appearance: She is well-developed. HENT:      Head: Normocephalic and atraumatic. Neck:      Vascular: No JVD. Cardiovascular:      Rate and Rhythm: Normal rate and regular rhythm. Heart sounds: Normal heart sounds.    Pulmonary:      Effort: Pulmonary effort is normal. No respiratory distress. Breath sounds: Normal breath sounds. Abdominal:      Tenderness: There is no right CVA tenderness or left CVA tenderness. Genitourinary:     Comments:     Musculoskeletal:      Cervical back: Neck supple. Skin:     General: Skin is warm and dry. Neurological:      Mental Status: She is alert and oriented to person, place, and time. Psychiatric:         Behavior: Behavior normal.         Thought Content: Thought content normal.         Judgment: Judgment normal.     ASSESSMENT/PLAN  Encounter Diagnoses     ICD-10-CM ICD-9-CM   1. UTI symptoms  R39.9 788.99   2. Hypertension, unspecified type  I10 401.9   3. Essential hypertension  I10 401.9   4.  Acute cystitis without hematuria  N30.00 595.0     Orders Placed This Encounter    AMB POC URINALYSIS DIP STICK AUTO W/O MICRO - pt unable to void today    Add losartan (COZAAR) 25 mg tablet    nitrofurantoin, macrocrystal-monohydrate, (Macrobid) 100 mg capsule    phenazopyridine (PYRIDIUM) 100 mg tablet

## 2022-10-03 NOTE — PROGRESS NOTES
Reviewed record in preparation for visit and have obtained necessary documentation. Identified pt with two pt identifiers(name and ). Chief Complaint   Patient presents with    Urinary Pain     Blood pressure (!) 143/53, pulse 87, resp. rate 18, height 5' 5\" (1.651 m), weight 179 lb 3.2 oz (81.3 kg), SpO2 97 %. Health Maintenance Due   Topic Date Due    Shingles Vaccine (1 of 2) Never done    Eye Exam  2021    COVID-19 Vaccine (4 - Booster for Moderna series) 2022    Yearly Flu Vaccine (1) 2022       Ms. Rahat Gatica has a reminder for a \"due or due soon\" health maintenance. I have asked that she discuss this further with her primary care provider for follow-up on this health maintenance. Coordination of Care Questionnaire:  :     1) Have you been to an emergency room, urgent care clinic since your last visit? no   Hospitalized since your last visit? no             2) Have you seen or consulted any other health care providers outside of [address] [place] [address] since your last visit? no  Eye doctor     3) In the event something were to happen to you and you were unable to speak on your behalf, do you have an Advance Directive/ Living Will in place stating your wishes?  YES

## 2022-10-19 DIAGNOSIS — I10 HYPERTENSION, UNSPECIFIED TYPE: ICD-10-CM

## 2022-10-19 RX ORDER — AMLODIPINE BESYLATE 10 MG/1
TABLET ORAL
Qty: 90 TABLET | Refills: 1 | Status: SHIPPED | OUTPATIENT
Start: 2022-10-19

## 2022-11-02 RX ORDER — ATORVASTATIN CALCIUM 80 MG/1
TABLET, FILM COATED ORAL
Qty: 90 TABLET | Refills: 1 | Status: SHIPPED | OUTPATIENT
Start: 2022-11-02

## 2022-11-15 DIAGNOSIS — E11.9 DIABETES MELLITUS WITHOUT COMPLICATION (HCC): ICD-10-CM

## 2022-11-15 RX ORDER — ISOPROPYL ALCOHOL 70 ML/100ML
SWAB TOPICAL
Qty: 200 PAD | Refills: 3 | Status: SHIPPED | OUTPATIENT
Start: 2022-11-15

## 2022-11-15 RX ORDER — SYRGE-NDL,INS 0.5 ML HALF MARK 31GX15/64"
SYRINGE, EMPTY DISPOSABLE MISCELLANEOUS
Qty: 200 EACH | Refills: 3 | Status: SHIPPED | OUTPATIENT
Start: 2022-11-15

## 2023-01-17 ENCOUNTER — OFFICE VISIT (OUTPATIENT)
Dept: INTERNAL MEDICINE CLINIC | Age: 83
End: 2023-01-17
Payer: MEDICARE

## 2023-01-17 VITALS
HEART RATE: 71 BPM | RESPIRATION RATE: 18 BRPM | SYSTOLIC BLOOD PRESSURE: 160 MMHG | TEMPERATURE: 98.3 F | WEIGHT: 172.4 LBS | DIASTOLIC BLOOD PRESSURE: 80 MMHG | HEIGHT: 65 IN | BODY MASS INDEX: 28.72 KG/M2 | OXYGEN SATURATION: 98 %

## 2023-01-17 DIAGNOSIS — E11.9 DIABETES MELLITUS WITHOUT COMPLICATION (HCC): ICD-10-CM

## 2023-01-17 DIAGNOSIS — R53.83 FATIGUE, UNSPECIFIED TYPE: ICD-10-CM

## 2023-01-17 DIAGNOSIS — Z79.4 TYPE 2 DIABETES MELLITUS WITH MICROALBUMINURIA, WITH LONG-TERM CURRENT USE OF INSULIN (HCC): ICD-10-CM

## 2023-01-17 DIAGNOSIS — E78.00 PURE HYPERCHOLESTEROLEMIA: ICD-10-CM

## 2023-01-17 DIAGNOSIS — I10 ESSENTIAL HYPERTENSION: Primary | ICD-10-CM

## 2023-01-17 DIAGNOSIS — R80.9 TYPE 2 DIABETES MELLITUS WITH MICROALBUMINURIA, WITH LONG-TERM CURRENT USE OF INSULIN (HCC): ICD-10-CM

## 2023-01-17 DIAGNOSIS — I10 HYPERTENSION, UNSPECIFIED TYPE: ICD-10-CM

## 2023-01-17 DIAGNOSIS — E11.29 TYPE 2 DIABETES MELLITUS WITH MICROALBUMINURIA, WITH LONG-TERM CURRENT USE OF INSULIN (HCC): ICD-10-CM

## 2023-01-17 PROCEDURE — 99214 OFFICE O/P EST MOD 30 MIN: CPT | Performed by: PHYSICIAN ASSISTANT

## 2023-01-17 PROCEDURE — G8536 NO DOC ELDER MAL SCRN: HCPCS | Performed by: PHYSICIAN ASSISTANT

## 2023-01-17 PROCEDURE — G8427 DOCREV CUR MEDS BY ELIG CLIN: HCPCS | Performed by: PHYSICIAN ASSISTANT

## 2023-01-17 PROCEDURE — G8399 PT W/DXA RESULTS DOCUMENT: HCPCS | Performed by: PHYSICIAN ASSISTANT

## 2023-01-17 PROCEDURE — 1101F PT FALLS ASSESS-DOCD LE1/YR: CPT | Performed by: PHYSICIAN ASSISTANT

## 2023-01-17 PROCEDURE — 1090F PRES/ABSN URINE INCON ASSESS: CPT | Performed by: PHYSICIAN ASSISTANT

## 2023-01-17 PROCEDURE — G8417 CALC BMI ABV UP PARAM F/U: HCPCS | Performed by: PHYSICIAN ASSISTANT

## 2023-01-17 PROCEDURE — G8432 DEP SCR NOT DOC, RNG: HCPCS | Performed by: PHYSICIAN ASSISTANT

## 2023-01-17 RX ORDER — METFORMIN HYDROCHLORIDE 500 MG/1
TABLET, EXTENDED RELEASE ORAL
Qty: 90 TABLET | Refills: 1 | Status: SHIPPED | OUTPATIENT
Start: 2023-01-17

## 2023-01-17 RX ORDER — AMLODIPINE BESYLATE 10 MG/1
TABLET ORAL
Qty: 90 TABLET | Refills: 1 | Status: SHIPPED | OUTPATIENT
Start: 2023-01-17

## 2023-01-17 RX ORDER — BLOOD SUGAR DIAGNOSTIC
STRIP MISCELLANEOUS
Qty: 200 STRIP | Refills: 3 | Status: SHIPPED | OUTPATIENT
Start: 2023-01-17

## 2023-01-17 RX ORDER — LOSARTAN POTASSIUM 50 MG/1
50 TABLET ORAL DAILY
Qty: 90 TABLET | Refills: 1 | Status: SHIPPED | OUTPATIENT
Start: 2023-01-17

## 2023-01-17 RX ORDER — LANCETS
EACH MISCELLANEOUS
Qty: 200 EACH | Refills: 3 | Status: SHIPPED | OUTPATIENT
Start: 2023-01-17

## 2023-01-17 RX ORDER — ATORVASTATIN CALCIUM 80 MG/1
TABLET, FILM COATED ORAL
Qty: 90 TABLET | Refills: 1 | Status: SHIPPED | OUTPATIENT
Start: 2023-01-17

## 2023-01-17 NOTE — PROGRESS NOTES
Chief Complaint   Patient presents with    Follow Up Chronic Condition    Diabetes     Patient is here for a F/U for her DM. Vitals:    01/17/23 1335   BP: (!) 155/73   Pulse: 71   Resp: 18   Temp: 98.3 °F (36.8 °C)   TempSrc: Temporal   SpO2: 98%   Weight: 172 lb 6.4 oz (78.2 kg)   Height: 5' 5\" (1.651 m)   PainSc:   0 - No pain       Current Outpatient Medications on File Prior to Visit   Medication Sig Dispense Refill    Droplet Insulin Syr,half unit, 0.5 mL 31 gauge x 15/64\" syrg USE TWICE DAILY  WITH  INSULIN 200 Each 3    DropSafe Alcohol Prep Pads padm USE 1 PAD TWICE DAILY AS DIRECTED 200 Pad 3    atorvastatin (LIPITOR) 80 mg tablet TAKE 1 TABLET EVERY DAY 90 Tablet 1    amLODIPine (NORVASC) 10 mg tablet TAKE 1 TABLET EVERY DAY 90 Tablet 1    omega 3-dha-epa-fish oil (Fish OiL) 100-160-1,000 mg cap Take  by mouth.      losartan (COZAAR) 25 mg tablet Take 1 Tablet by mouth daily. 90 Tablet 1    metFORMIN ER (GLUCOPHAGE XR) 500 mg tablet TAKE 1 TABLET EVERY DAY WITH DINNER. 90 Tablet 1    lancets (Accu-Chek Softclix Lancets) misc Test BID. E11.9 200 Each 3    glucose blood VI test strips (Accu-Chek Irene Plus test strp) strip Check BS bid 200 Strip 3    Blood-Glucose Meter (Accu-Chek Irene Plus Meter) misc 1 Each by Does Not Apply route two (2) times a day. E11.9 1 Each 1    insulin NPH/insulin regular (NovoLIN 70/30 U-100 Insulin) 100 unit/mL (70-30) injection INJECT 40 UNITS UNDER THE SKIN EVERY MORNING (Patient taking differently: 40 Units by SubCUTAneous route Every morning. INJECT 40 UNITS UNDER THE SKIN EVERY MORNING and 20 units subcutaneous every evening. Indications: type 2 diabetes mellitus) 6 Each 1    cetirizine (ZYRTEC) 10 mg tablet Take 1 Tablet by mouth nightly. Indications: a type of allergy that causes red and itchy skin called atopic dermatitis, hives      insulin syringe-needle U-100 0.3 mL 31 gauge x 15/64\" syrg Use BID with insulin.  Dx E11.9. 180 Pen Needle 3    insulin U-500 syringe-needle (BD Insulin Syringe U-500) 1/2 mL 31 gauge x 15/64\" syrg Use twice daily with insulin. 180 Syringe 3    Blood Glucose Control High&Low (ACCU-CHEK [place] CONTROL SOLN) soln Test meter once weekly. E11.9 1 Bottle 1    [DISCONTINUED] phenazopyridine (PYRIDIUM) 100 mg tablet Take 1 Tablet by mouth three (3) times daily as needed for Pain. 21 Tablet 0    triamcinolone acetonide (KENALOG) 0.1 % ointment Apply  to affected area two (2) times a day. use thin layer (Patient not taking: No sig reported) 60 g 1    hydrocortisone (CORTAID) 1 % topical cream Apply  to affected area two (2) times daily as needed. use thin layer on Right face/eyelid. (Patient not taking: No sig reported) 30 g 0    MULTIVITAMIN W-MINERALS/LUTEIN (CENTRUM SILVER PO) Take  by mouth. Takes one po daily  (Patient not taking: Reported on 1/17/2023)       No current facility-administered medications on file prior to visit. Health Maintenance Due   Topic    Shingles Vaccine (1 of 2)    Foot Exam Q1     Eye Exam Retinal or Dilated     COVID-19 Vaccine (4 - Booster for Moderna series)    Flu Vaccine (1)       1. \"Have you been to the ER, urgent care clinic since your last visit? Hospitalized since your last visit? \" No    2. \"Have you seen or consulted any other health care providers outside of the [address] [place] [address] since your last visit? \" No     3. For patients aged 39-70: Has the patient had a colonoscopy / FIT/ Cologuard? NA - based on age      If the patient is female:    4. For patients aged 41-77: Has the patient had a mammogram within the past 2 years? NA - based on age or sex      11. For patients aged 21-65: Has the patient had a pap smear?  NA - based on age or sex

## 2023-01-17 NOTE — PATIENT INSTRUCTIONS
Slowly decrease insulin dose until Fasting Blood Sugar is 120-140 consistently. Then call to let me know what dose you're using so I can change the Rx.

## 2023-01-17 NOTE — PROGRESS NOTES
Zula Meter is a [de-identified] y.o. female  Chief Complaint   Patient presents with    Follow Up Chronic Condition    Diabetes     Patient is here for a F/U for her DM. Visit Vitals  BP (!) 155/73 (BP 1 Location: Left upper arm, BP Patient Position: Sitting, BP Cuff Size: Large adult)   Pulse 71   Temp 98.3 °F (36.8 °C) (Temporal)   Resp 18   Ht 5' 5\" (1.651 m)   Wt 172 lb 6.4 oz (78.2 kg)   SpO2 98%   BMI 28.69 kg/m²      Health Maintenance Due   Topic Date Due    Shingles Vaccine (1 of 2) Never done    Foot Exam Q1  05/17/2018    Eye Exam Retinal or Dilated  05/22/2021    COVID-19 Vaccine (4 - Booster for Moderna series) 01/22/2022    Flu Vaccine (1) 08/01/2022     HPI  DM II - controlled at last check. Due for recheck. Feeling tired, down but not depressed, low energy. Had a few episodes of BS in 76s. Never lower than this. Lab Results   Component Value Date/Time    Hemoglobin A1c 6.2 (H) 08/22/2022 10:53 AM    Hemoglobin A1c 6.1 (H) 04/05/2022 10:35 AM    Hemoglobin A1c 6.2 (H) 07/07/2021 11:01 AM    Glucose 122 (H) 08/22/2022 10:53 AM    Microalbumin/Creat ratio (mg/g creat) 19 08/22/2022 10:53 AM    Microalbumin,urine random 2.58 08/22/2022 10:53 AM    LDL, calculated 59.6 08/22/2022 10:53 AM    Creatinine 1.06 (H) 08/22/2022 10:53 AM     Currently taking:   Key Antihyperglycemic Medications               metFORMIN ER (GLUCOPHAGE XR) 500 mg tablet (Taking) TAKE 1 TABLET EVERY DAY WITH DINNER. insulin NPH/insulin regular (NovoLIN 70/30 U-100 Insulin) 100 unit/mL (70-30) injection (Taking) INJECT 40 UNITS UNDER THE SKIN EVERY MORNING          Weight loss! Pleased. Wt Readings from Last 3 Encounters:   01/17/23 172 lb 6.4 oz (78.2 kg)   10/03/22 179 lb 3.2 oz (81.3 kg)   08/22/22 175 lb 6.4 oz (79.6 kg)     HTN Follow up - BP remains uncontrolled: forgetting to take HTN meds ~2x/week. Feels that she can work on this. Denies regular memory problems.    BP Readings from Last 3 Encounters:   01/17/23 (!) 155/73 10/03/22 (!) 148/80   08/22/22 (!) 124/55     Currently taking:   Key CAD CHF Meds               atorvastatin (LIPITOR) 80 mg tablet (Taking) TAKE 1 TABLET EVERY DAY    amLODIPine (NORVASC) 10 mg tablet (Taking) TAKE 1 TABLET EVERY DAY    omega 3-dha-epa-fish oil (Fish OiL) 100-160-1,000 mg cap (Taking) Take  by mouth.    losartan (COZAAR) 25 mg tablet (Taking) Take 1 Tablet by mouth daily. ROS  Review of Systems   Constitutional:  Positive for malaise/fatigue. Respiratory:  Negative for shortness of breath. Cardiovascular:  Negative for chest pain and palpitations. Musculoskeletal:  Negative for falls. Neurological:  Negative for dizziness, loss of consciousness and weakness. Psychiatric/Behavioral:  Negative for depression. The patient is nervous/anxious. EXAM  Physical Exam  Vitals and nursing note reviewed. Constitutional:       General: She is not in acute distress. Appearance: She is well-developed. HENT:      Head: Normocephalic and atraumatic. Neck:      Vascular: No JVD. Cardiovascular:      Rate and Rhythm: Normal rate and regular rhythm. Heart sounds: Normal heart sounds. Pulmonary:      Effort: Pulmonary effort is normal. No respiratory distress. Breath sounds: Normal breath sounds. Musculoskeletal:      Cervical back: Neck supple. Skin:     General: Skin is warm and dry. Neurological:      Mental Status: She is alert and oriented to person, place, and time. Psychiatric:         Mood and Affect: Mood normal.         Behavior: Behavior normal.         Thought Content: Thought content normal.         Judgment: Judgment normal.       ASSESSMENT/PLAN  Encounter Diagnoses     ICD-10-CM ICD-9-CM   1. Essential hypertension  I10 401.9   2. Type 2 diabetes mellitus with microalbuminuria, with long-term current use of insulin (HCC)  E11.29 250.40    R80.9 791.0    Z79.4 V58.67   3. Pure hypercholesterolemia  E78.00 272.0   4.  Fatigue, unspecified type R53.83 780.79   5. Diabetes mellitus without complication (HCC)  G67.8 250.00   6.  Hypertension, unspecified type  I10 401.9     Orders Placed This Encounter    METABOLIC PANEL, COMPREHENSIVE    HEMOGLOBIN A1C WITH EAG    LIPID PANEL    MICROALBUMIN, UR, RAND W/ MICROALB/CREAT RATIO    TSH 3RD GENERATION    CBC WITH AUTOMATED DIFF    losartan (COZAAR) 50 mg tablet    metFORMIN ER (GLUCOPHAGE XR) 500 mg tablet    atorvastatin (LIPITOR) 80 mg tablet    amLODIPine (NORVASC) 10 mg tablet    lancets (Accu-Chek Softclix Lancets) misc    glucose blood VI test strips (Accu-Chek Irene Plus test strp) strip     Instructed pt to lower insulin dose until -140s consistently

## 2023-01-18 DIAGNOSIS — R53.83 FATIGUE, UNSPECIFIED TYPE: ICD-10-CM

## 2023-01-18 DIAGNOSIS — E11.29 TYPE 2 DIABETES MELLITUS WITH MICROALBUMINURIA, WITH LONG-TERM CURRENT USE OF INSULIN (HCC): ICD-10-CM

## 2023-01-18 DIAGNOSIS — Z79.4 TYPE 2 DIABETES MELLITUS WITH MICROALBUMINURIA, WITH LONG-TERM CURRENT USE OF INSULIN (HCC): ICD-10-CM

## 2023-01-18 DIAGNOSIS — R80.9 TYPE 2 DIABETES MELLITUS WITH MICROALBUMINURIA, WITH LONG-TERM CURRENT USE OF INSULIN (HCC): ICD-10-CM

## 2023-01-19 LAB
ALBUMIN SERPL-MCNC: 3.9 G/DL (ref 3.5–5)
ALBUMIN/GLOB SERPL: 1.2 (ref 1.1–2.2)
ALP SERPL-CCNC: 98 U/L (ref 45–117)
ALT SERPL-CCNC: 18 U/L (ref 12–78)
ANION GAP SERPL CALC-SCNC: 4 MMOL/L (ref 5–15)
AST SERPL-CCNC: 8 U/L (ref 15–37)
BASOPHILS # BLD: 0 K/UL (ref 0–0.1)
BASOPHILS NFR BLD: 0 % (ref 0–1)
BILIRUB SERPL-MCNC: 0.8 MG/DL (ref 0.2–1)
BUN SERPL-MCNC: 14 MG/DL (ref 6–20)
BUN/CREAT SERPL: 16 (ref 12–20)
CALCIUM SERPL-MCNC: 9.6 MG/DL (ref 8.5–10.1)
CHLORIDE SERPL-SCNC: 107 MMOL/L (ref 97–108)
CHOLEST SERPL-MCNC: 125 MG/DL
CO2 SERPL-SCNC: 30 MMOL/L (ref 21–32)
CREAT SERPL-MCNC: 0.87 MG/DL (ref 0.55–1.02)
CREAT UR-MCNC: 73.5 MG/DL
DIFFERENTIAL METHOD BLD: NORMAL
EOSINOPHIL # BLD: 0.1 K/UL (ref 0–0.4)
EOSINOPHIL NFR BLD: 1 % (ref 0–7)
ERYTHROCYTE [DISTWIDTH] IN BLOOD BY AUTOMATED COUNT: 12.6 % (ref 11.5–14.5)
EST. AVERAGE GLUCOSE BLD GHB EST-MCNC: 134 MG/DL
GLOBULIN SER CALC-MCNC: 3.2 G/DL (ref 2–4)
GLUCOSE SERPL-MCNC: 131 MG/DL (ref 65–100)
HBA1C MFR BLD: 6.3 % (ref 4–5.6)
HCT VFR BLD AUTO: 43.8 % (ref 35–47)
HDLC SERPL-MCNC: 52 MG/DL
HDLC SERPL: 2.4 (ref 0–5)
HGB BLD-MCNC: 13.6 G/DL (ref 11.5–16)
IMM GRANULOCYTES # BLD AUTO: 0 K/UL (ref 0–0.04)
IMM GRANULOCYTES NFR BLD AUTO: 0 % (ref 0–0.5)
LDLC SERPL CALC-MCNC: 45.6 MG/DL (ref 0–100)
LYMPHOCYTES # BLD: 1.8 K/UL (ref 0.8–3.5)
LYMPHOCYTES NFR BLD: 32 % (ref 12–49)
MCH RBC QN AUTO: 29.1 PG (ref 26–34)
MCHC RBC AUTO-ENTMCNC: 31.1 G/DL (ref 30–36.5)
MCV RBC AUTO: 93.8 FL (ref 80–99)
MICROALBUMIN UR-MCNC: 1.62 MG/DL
MICROALBUMIN/CREAT UR-RTO: 22 MG/G (ref 0–30)
MONOCYTES # BLD: 0.3 K/UL (ref 0–1)
MONOCYTES NFR BLD: 6 % (ref 5–13)
NEUTS SEG # BLD: 3.3 K/UL (ref 1.8–8)
NEUTS SEG NFR BLD: 61 % (ref 32–75)
NRBC # BLD: 0 K/UL (ref 0–0.01)
NRBC BLD-RTO: 0 PER 100 WBC
PLATELET # BLD AUTO: 157 K/UL (ref 150–400)
PMV BLD AUTO: 11.8 FL (ref 8.9–12.9)
POTASSIUM SERPL-SCNC: 4.5 MMOL/L (ref 3.5–5.1)
PROT SERPL-MCNC: 7.1 G/DL (ref 6.4–8.2)
RBC # BLD AUTO: 4.67 M/UL (ref 3.8–5.2)
SODIUM SERPL-SCNC: 141 MMOL/L (ref 136–145)
TRIGL SERPL-MCNC: 137 MG/DL (ref ?–150)
TSH SERPL DL<=0.05 MIU/L-ACNC: 0.93 UIU/ML (ref 0.36–3.74)
VLDLC SERPL CALC-MCNC: 27.4 MG/DL
WBC # BLD AUTO: 5.5 K/UL (ref 3.6–11)

## 2023-02-17 ENCOUNTER — OFFICE VISIT (OUTPATIENT)
Dept: INTERNAL MEDICINE CLINIC | Age: 83
End: 2023-02-17
Payer: MEDICARE

## 2023-02-17 VITALS
HEART RATE: 76 BPM | RESPIRATION RATE: 14 BRPM | HEIGHT: 65 IN | SYSTOLIC BLOOD PRESSURE: 135 MMHG | WEIGHT: 175 LBS | BODY MASS INDEX: 29.16 KG/M2 | TEMPERATURE: 98.3 F | OXYGEN SATURATION: 97 % | DIASTOLIC BLOOD PRESSURE: 54 MMHG

## 2023-02-17 DIAGNOSIS — Z79.4 TYPE 2 DIABETES MELLITUS WITH MICROALBUMINURIA, WITH LONG-TERM CURRENT USE OF INSULIN (HCC): ICD-10-CM

## 2023-02-17 DIAGNOSIS — R80.9 TYPE 2 DIABETES MELLITUS WITH MICROALBUMINURIA, WITH LONG-TERM CURRENT USE OF INSULIN (HCC): ICD-10-CM

## 2023-02-17 DIAGNOSIS — R53.83 FATIGUE, UNSPECIFIED TYPE: ICD-10-CM

## 2023-02-17 DIAGNOSIS — R06.83 SNORING: Primary | ICD-10-CM

## 2023-02-17 DIAGNOSIS — E11.29 TYPE 2 DIABETES MELLITUS WITH MICROALBUMINURIA, WITH LONG-TERM CURRENT USE OF INSULIN (HCC): ICD-10-CM

## 2023-02-17 DIAGNOSIS — I10 ESSENTIAL HYPERTENSION: ICD-10-CM

## 2023-02-17 PROBLEM — E11.22 TYPE 2 DIABETES MELLITUS WITH CHRONIC KIDNEY DISEASE (HCC): Status: ACTIVE | Noted: 2023-02-17

## 2023-02-17 RX ORDER — LOSARTAN POTASSIUM 25 MG/1
25 TABLET ORAL DAILY
Qty: 90 TABLET | Refills: 1 | Status: SHIPPED | OUTPATIENT
Start: 2023-02-17

## 2023-02-17 RX ORDER — INSULIN GLARGINE 100 [IU]/ML
INJECTION, SOLUTION SUBCUTANEOUS
Qty: 6 ADJUSTABLE DOSE PRE-FILLED PEN SYRINGE | Refills: 1 | Status: SHIPPED | OUTPATIENT
Start: 2023-02-17

## 2023-02-17 NOTE — PROGRESS NOTES
Vu Esquivel is a [de-identified] y.o. female  Chief Complaint   Patient presents with    Follow-up     Bp check - pt would like to know how she can manage her fatigue      Visit Vitals  BP (!) 135/54 (BP 1 Location: Left upper arm, BP Patient Position: Sitting, BP Cuff Size: Adult)   Pulse 76   Temp 98.3 °F (36.8 °C) (Temporal)   Resp 14   Ht 5' 5\" (1.651 m)   Wt 175 lb (79.4 kg)   SpO2 97%   BMI 29.12 kg/m²      Health Maintenance Due   Topic Date Due    Shingles Vaccine (1 of 2) Never done    Foot Exam Q1  05/17/2018    Eye Exam Retinal or Dilated  05/22/2021    COVID-19 Vaccine (4 - Booster for Moderna series) 01/22/2022    Flu Vaccine (1) 08/01/2022       HPI  Fatigue persists. CBC and TSH normal. Snoring regularly. Lab Results   Component Value Date/Time    TSH 0.93 01/18/2023 10:12 AM     Lab Results   Component Value Date/Time    HGB 13.6 01/18/2023 10:12 AM       HTN Follow up - BP slightly over-controlled: taking meds regularly now. BP Readings from Last 3 Encounters:   02/17/23 (!) 135/54   01/17/23 (!) 160/80   10/03/22 (!) 148/80     Currently taking:   Key CAD CHF Meds               losartan (COZAAR) 50 mg tablet (Taking) Take 1 Tablet by mouth daily. atorvastatin (LIPITOR) 80 mg tablet (Taking) TAKE 1 TABLET EVERY DAY    amLODIPine (NORVASC) 10 mg tablet (Taking) TAKE 1 TABLET EVERY DAY    omega 3-dha-epa-fish oil (Fish OiL) 100-160-1,000 mg cap (Taking) Take  by mouth. Lab Results   Component Value Date/Time    Creatinine 0.87 01/18/2023 10:12 AM     DM II - over-controlled for age.     Lab Results   Component Value Date/Time    Hemoglobin A1c 6.3 (H) 01/18/2023 10:12 AM    Hemoglobin A1c 6.2 (H) 08/22/2022 10:53 AM    Hemoglobin A1c 6.1 (H) 04/05/2022 10:35 AM    Glucose 131 (H) 01/18/2023 10:12 AM    Microalbumin/Creat ratio (mg/g creat) 22 01/18/2023 10:12 AM    Microalbumin,urine random 1.62 01/18/2023 10:12 AM    LDL, calculated 45.6 01/18/2023 10:12 AM    Creatinine 0.87 01/18/2023 10:12 AM     Currently taking:   Key Antihyperglycemic Medications               metFORMIN ER (GLUCOPHAGE XR) 500 mg tablet (Taking) TAKE 1 TABLET EVERY DAY WITH DINNER. insulin NPH/insulin regular (NovoLIN 70/30 U-100 Insulin) 100 unit/mL (70-30) injection (Taking) INJECT 40 UNITS UNDER THE SKIN EVERY MORNING          Weight is stable. Wt Readings from Last 3 Encounters:   02/17/23 175 lb (79.4 kg)   01/17/23 172 lb 6.4 oz (78.2 kg)   10/03/22 179 lb 3.2 oz (81.3 kg)     ROS  Review of Systems   Constitutional:  Positive for malaise/fatigue. Negative for fever. Respiratory:  Negative for shortness of breath. Cardiovascular:  Negative for chest pain and palpitations. Neurological:  Negative for dizziness, loss of consciousness and weakness. Psychiatric/Behavioral:  Negative for depression. The patient is not nervous/anxious and does not have insomnia. EXAM  Physical Exam  Vitals and nursing note reviewed. Constitutional:       General: She is not in acute distress. Appearance: She is well-developed. HENT:      Head: Normocephalic and atraumatic. Neck:      Vascular: No JVD. Cardiovascular:      Rate and Rhythm: Normal rate and regular rhythm. Heart sounds: Normal heart sounds. Pulmonary:      Effort: Pulmonary effort is normal. No respiratory distress. Breath sounds: Normal breath sounds. Musculoskeletal:      Cervical back: Neck supple. Skin:     General: Skin is warm and dry. Neurological:      Mental Status: She is alert and oriented to person, place, and time. Psychiatric:         Mood and Affect: Mood normal.         Behavior: Behavior normal.         Thought Content: Thought content normal.         Judgment: Judgment normal.       ASSESSMENT/PLAN  Encounter Diagnoses     ICD-10-CM ICD-9-CM   1. Snoring  R06.83 786.09   2. Fatigue, unspecified type  R53.83 780.79   3. Essential hypertension  I10 401.9   4.  Type 2 diabetes mellitus with microalbuminuria, with long-term current use of insulin (HCC)  E11.29 250.40    R80.9 791.0    Z79.4 V58.67     Orders Placed This Encounter    REFERRAL TO PULMONARY DISEASE    Lower dose from 50 mg to losartan (COZAAR) 25 mg tablet    Change 40 units of 70/30 to insulin glargine (LANTUS,BASAGLAR) 100 unit/mL (3 mL) inpn

## 2023-04-17 ENCOUNTER — OFFICE VISIT (OUTPATIENT)
Dept: INTERNAL MEDICINE CLINIC | Age: 83
End: 2023-04-17
Payer: MEDICARE

## 2023-04-17 VITALS
HEIGHT: 65 IN | SYSTOLIC BLOOD PRESSURE: 125 MMHG | RESPIRATION RATE: 14 BRPM | OXYGEN SATURATION: 98 % | BODY MASS INDEX: 28.82 KG/M2 | WEIGHT: 173 LBS | TEMPERATURE: 98.2 F | HEART RATE: 71 BPM | DIASTOLIC BLOOD PRESSURE: 58 MMHG

## 2023-04-17 DIAGNOSIS — Z79.4 TYPE 2 DIABETES MELLITUS WITH MICROALBUMINURIA, WITH LONG-TERM CURRENT USE OF INSULIN (HCC): Primary | ICD-10-CM

## 2023-04-17 DIAGNOSIS — R80.9 TYPE 2 DIABETES MELLITUS WITH MICROALBUMINURIA, WITH LONG-TERM CURRENT USE OF INSULIN (HCC): Primary | ICD-10-CM

## 2023-04-17 DIAGNOSIS — I10 ESSENTIAL HYPERTENSION: ICD-10-CM

## 2023-04-17 DIAGNOSIS — E11.29 TYPE 2 DIABETES MELLITUS WITH MICROALBUMINURIA, WITH LONG-TERM CURRENT USE OF INSULIN (HCC): Primary | ICD-10-CM

## 2023-04-17 LAB — HBA1C MFR BLD HPLC: 6.5 %

## 2023-04-17 PROCEDURE — G8427 DOCREV CUR MEDS BY ELIG CLIN: HCPCS | Performed by: PHYSICIAN ASSISTANT

## 2023-04-17 PROCEDURE — 1090F PRES/ABSN URINE INCON ASSESS: CPT | Performed by: PHYSICIAN ASSISTANT

## 2023-04-17 PROCEDURE — 99214 OFFICE O/P EST MOD 30 MIN: CPT | Performed by: PHYSICIAN ASSISTANT

## 2023-04-17 PROCEDURE — G8432 DEP SCR NOT DOC, RNG: HCPCS | Performed by: PHYSICIAN ASSISTANT

## 2023-04-17 PROCEDURE — G8399 PT W/DXA RESULTS DOCUMENT: HCPCS | Performed by: PHYSICIAN ASSISTANT

## 2023-04-17 PROCEDURE — G8536 NO DOC ELDER MAL SCRN: HCPCS | Performed by: PHYSICIAN ASSISTANT

## 2023-04-17 PROCEDURE — 1101F PT FALLS ASSESS-DOCD LE1/YR: CPT | Performed by: PHYSICIAN ASSISTANT

## 2023-04-17 PROCEDURE — 83036 HEMOGLOBIN GLYCOSYLATED A1C: CPT | Performed by: PHYSICIAN ASSISTANT

## 2023-04-17 PROCEDURE — G8417 CALC BMI ABV UP PARAM F/U: HCPCS | Performed by: PHYSICIAN ASSISTANT

## 2023-04-17 RX ORDER — AMLODIPINE BESYLATE 5 MG/1
5 TABLET ORAL DAILY
Qty: 90 TABLET | Refills: 1 | Status: SHIPPED | OUTPATIENT
Start: 2023-04-17

## 2023-04-17 NOTE — PROGRESS NOTES
Ana Maria Malcolm is a [de-identified] y.o. female  Chief Complaint   Patient presents with    Follow-up     No concerns - fasting      Visit Vitals  BP (!) 125/58 (BP 1 Location: Left upper arm, BP Patient Position: Sitting, BP Cuff Size: Adult)   Pulse 71   Temp 98.2 °F (36.8 °C) (Temporal)   Resp 14   Ht 5' 5\" (1.651 m)   Wt 173 lb (78.5 kg)   SpO2 98%   BMI 28.79 kg/m²      Health Maintenance Due   Topic Date Due    Shingles Vaccine (1 of 2) Never done    Foot Exam Q1  05/17/2018    Eye Exam Retinal or Dilated  05/22/2021    COVID-19 Vaccine (4 - Booster for Moderna series) 01/22/2022       HPI  HTN Follow up - BP remains overcontrolled: lowered dose to 25 mg of Losartan at last visit 2/17. BP is still low. BP Readings from Last 3 Encounters:   04/17/23 (!) 125/58   02/17/23 (!) 135/54   01/17/23 (!) 160/80     Currently taking:   Key CAD CHF Meds               losartan (COZAAR) 25 mg tablet (Taking) Take 1 Tablet by mouth daily. atorvastatin (LIPITOR) 80 mg tablet (Taking) TAKE 1 TABLET EVERY DAY    amLODIPine (NORVASC) 10 mg tablet (Taking) TAKE 1 TABLET EVERY DAY    omega 3-dha-epa-fish oil (Fish OiL) 100-160-1,000 mg cap (Taking) Take  by mouth. Lab Results   Component Value Date/Time    Creatinine 0.87 01/18/2023 10:12 AM     DM II - Changed 40 unites of 70/30 to Lantus 20 units once daily at last visit. Pt is using 20-40 units of Lantus daily based on FBS. FBSs up to 140s rarely, which is when pt uses 40 unites of Lantus. Advised pt that this was not a good idea and she notes understanding. A1c today is still a little low for age and pt notes low energy levels lately.    Hemoglobin A1c (POC)   Date Value Ref Range Status   04/17/2023 6.5 % Final      Lab Results   Component Value Date/Time    Hemoglobin A1c 6.3 (H) 01/18/2023 10:12 AM    Hemoglobin A1c 6.2 (H) 08/22/2022 10:53 AM    Hemoglobin A1c 6.1 (H) 04/05/2022 10:35 AM    Glucose 131 (H) 01/18/2023 10:12 AM    Microalbumin/Creat ratio (mg/g creat) 22 01/18/2023 10:12 AM    Microalbumin,urine random 1.62 01/18/2023 10:12 AM    LDL, calculated 45.6 01/18/2023 10:12 AM    Creatinine 0.87 01/18/2023 10:12 AM     Currently taking:   Key Antihyperglycemic Medications               insulin glargine (LANTUS,BASAGLAR) 100 unit/mL (3 mL) inpn (Taking) 20 units SC once daily. metFORMIN ER (GLUCOPHAGE XR) 500 mg tablet (Taking) TAKE 1 TABLET EVERY DAY WITH DINNER. Wt Readings from Last 3 Encounters:   04/17/23 173 lb (78.5 kg)   02/17/23 175 lb (79.4 kg)   01/17/23 172 lb 6.4 oz (78.2 kg)     ROS  Review of Systems   Constitutional:  Positive for malaise/fatigue. Negative for fever. Respiratory:  Negative for shortness of breath. Cardiovascular:  Negative for chest pain and palpitations. Neurological:  Negative for dizziness, loss of consciousness and weakness. Psychiatric/Behavioral:  Negative for depression. EXAM  Physical Exam  Vitals and nursing note reviewed. Constitutional:       General: She is not in acute distress. Appearance: She is well-developed. HENT:      Head: Normocephalic and atraumatic. Neck:      Vascular: No JVD. Cardiovascular:      Rate and Rhythm: Normal rate and regular rhythm. Heart sounds: Normal heart sounds. Pulmonary:      Effort: Pulmonary effort is normal. No respiratory distress. Breath sounds: Normal breath sounds. Musculoskeletal:      Cervical back: Neck supple. Skin:     General: Skin is warm and dry. Neurological:      Mental Status: She is alert and oriented to person, place, and time. Psychiatric:         Mood and Affect: Mood normal.         Behavior: Behavior normal.         Thought Content: Thought content normal.         Judgment: Judgment normal.       ASSESSMENT/PLAN  Encounter Diagnoses     ICD-10-CM ICD-9-CM   1. Type 2 diabetes mellitus with microalbuminuria, with long-term current use of insulin ([facility])  E11.29 250.40    R80.9 791.0    Z79.4 V58.67   2.  Essential hypertension  I10 401.9     Orders Placed This Encounter    AMB POC HEMOGLOBIN A1C    Lower dose to amLODIPine (NORVASC) 5 mg tablet   Instructed pt to avoid overdosing Lantus. Stick with 10-25 units at most per day.

## 2023-07-03 ENCOUNTER — TELEPHONE (OUTPATIENT)
Age: 83
End: 2023-07-03

## 2023-07-03 DIAGNOSIS — E11.29 TYPE 2 DIABETES MELLITUS WITH OTHER DIABETIC KIDNEY COMPLICATION (HCC): Primary | ICD-10-CM

## 2023-07-03 RX ORDER — ATORVASTATIN CALCIUM 80 MG/1
TABLET, FILM COATED ORAL
Qty: 90 TABLET | Refills: 1 | Status: SHIPPED | OUTPATIENT
Start: 2023-07-03

## 2023-07-03 RX ORDER — PEN NEEDLE, DIABETIC 32GX 5/32"
1 NEEDLE, DISPOSABLE MISCELLANEOUS DAILY
Qty: 100 EACH | Refills: 0 | Status: SHIPPED | OUTPATIENT
Start: 2023-07-03 | End: 2023-07-05 | Stop reason: SDUPTHER

## 2023-07-03 NOTE — TELEPHONE ENCOUNTER
Pt called about  med       insulin glargine (LANTUS SOLOSTAR) 100 UNIT/ML injection pen       Pt stated that there are no needles in the box and needs a order to be sent to the pharmacy that she has on file. Also left a contact number for the pharmacy [phone].

## 2023-07-03 NOTE — TELEPHONE ENCOUNTER
Pt calling back about needing insulin needles. She says she will be out by Thursday, and is wondering if a short supply of needles could be sent to [facility] to last her until order comes in from [address].

## 2023-07-05 ENCOUNTER — TELEPHONE (OUTPATIENT)
Age: 83
End: 2023-07-05

## 2023-07-05 DIAGNOSIS — E11.29 TYPE 2 DIABETES MELLITUS WITH OTHER DIABETIC KIDNEY COMPLICATION (HCC): ICD-10-CM

## 2023-07-05 RX ORDER — PEN NEEDLE, DIABETIC 32GX 5/32"
1 NEEDLE, DISPOSABLE MISCELLANEOUS DAILY
Qty: 100 EACH | Refills: 3 | Status: SHIPPED | OUTPATIENT
Start: 2023-07-05

## 2023-07-05 NOTE — TELEPHONE ENCOUNTER
Pharmacy called about refill for pt for the following medication      Insulin Pen Needle (BD PEN NEEDLE AMAN 2ND GEN) 32G X 4 MM MISC

## 2023-07-07 RX ORDER — GLUCOSAM/CHON-MSM1/C/MANG/BOSW 500-416.6
TABLET ORAL
Qty: 200 EACH | Refills: 3 | Status: SHIPPED | OUTPATIENT
Start: 2023-07-07

## 2023-07-12 RX ORDER — METFORMIN HYDROCHLORIDE 500 MG/1
TABLET, EXTENDED RELEASE ORAL
Qty: 90 TABLET | Refills: 0 | Status: SHIPPED | OUTPATIENT
Start: 2023-07-12

## 2023-07-18 ENCOUNTER — OFFICE VISIT (OUTPATIENT)
Age: 83
End: 2023-07-18
Payer: MEDICARE

## 2023-07-18 VITALS
TEMPERATURE: 98.3 F | HEART RATE: 69 BPM | OXYGEN SATURATION: 97 % | RESPIRATION RATE: 14 BRPM | HEIGHT: 65 IN | DIASTOLIC BLOOD PRESSURE: 62 MMHG | BODY MASS INDEX: 28.66 KG/M2 | SYSTOLIC BLOOD PRESSURE: 155 MMHG | WEIGHT: 172 LBS

## 2023-07-18 DIAGNOSIS — Z63.4 WIDOWED: ICD-10-CM

## 2023-07-18 DIAGNOSIS — R80.9 TYPE 2 DIABETES MELLITUS WITH MICROALBUMINURIA, WITH LONG-TERM CURRENT USE OF INSULIN (HCC): Primary | ICD-10-CM

## 2023-07-18 DIAGNOSIS — Z79.4 TYPE 2 DIABETES MELLITUS WITH MICROALBUMINURIA, WITH LONG-TERM CURRENT USE OF INSULIN (HCC): Primary | ICD-10-CM

## 2023-07-18 DIAGNOSIS — E11.29 TYPE 2 DIABETES MELLITUS WITH MICROALBUMINURIA, WITH LONG-TERM CURRENT USE OF INSULIN (HCC): Primary | ICD-10-CM

## 2023-07-18 DIAGNOSIS — I10 ESSENTIAL HYPERTENSION: ICD-10-CM

## 2023-07-18 PROCEDURE — G8399 PT W/DXA RESULTS DOCUMENT: HCPCS | Performed by: PHYSICIAN ASSISTANT

## 2023-07-18 PROCEDURE — 1123F ACP DISCUSS/DSCN MKR DOCD: CPT | Performed by: PHYSICIAN ASSISTANT

## 2023-07-18 PROCEDURE — 99214 OFFICE O/P EST MOD 30 MIN: CPT | Performed by: PHYSICIAN ASSISTANT

## 2023-07-18 PROCEDURE — G8428 CUR MEDS NOT DOCUMENT: HCPCS | Performed by: PHYSICIAN ASSISTANT

## 2023-07-18 PROCEDURE — 3077F SYST BP >= 140 MM HG: CPT | Performed by: PHYSICIAN ASSISTANT

## 2023-07-18 PROCEDURE — 3078F DIAST BP <80 MM HG: CPT | Performed by: PHYSICIAN ASSISTANT

## 2023-07-18 PROCEDURE — 3044F HG A1C LEVEL LT 7.0%: CPT | Performed by: PHYSICIAN ASSISTANT

## 2023-07-18 PROCEDURE — 1036F TOBACCO NON-USER: CPT | Performed by: PHYSICIAN ASSISTANT

## 2023-07-18 PROCEDURE — G8417 CALC BMI ABV UP PARAM F/U: HCPCS | Performed by: PHYSICIAN ASSISTANT

## 2023-07-18 PROCEDURE — 1090F PRES/ABSN URINE INCON ASSESS: CPT | Performed by: PHYSICIAN ASSISTANT

## 2023-07-18 RX ORDER — LOSARTAN POTASSIUM 50 MG/1
50 TABLET ORAL DAILY
Qty: 90 TABLET | Refills: 1
Start: 2023-07-18

## 2023-07-18 SDOH — SOCIAL STABILITY - SOCIAL INSECURITY: DISSAPEARANCE AND DEATH OF FAMILY MEMBER: Z63.4

## 2023-07-18 ASSESSMENT — ENCOUNTER SYMPTOMS
COUGH: 0
SHORTNESS OF BREATH: 0

## 2023-07-18 NOTE — PROGRESS NOTES
Negative for cough and shortness of breath. Cardiovascular:  Negative for chest pain and palpitations. Neurological:  Negative for headaches. Psychiatric/Behavioral:  Negative for dysphoric mood. EXAM  Physical Exam  Vitals and nursing note reviewed. Constitutional:       Appearance: Normal appearance. HENT:      Head: Normocephalic and atraumatic. Neck:      Vascular: No carotid bruit. Cardiovascular:      Rate and Rhythm: Normal rate and regular rhythm. Heart sounds: Normal heart sounds. Pulmonary:      Effort: Pulmonary effort is normal. No respiratory distress. Breath sounds: Normal breath sounds. Musculoskeletal:      Cervical back: Neck supple. Skin:     General: Skin is warm and dry. Neurological:      General: No focal deficit present. Mental Status: She is alert and oriented to person, place, and time. Psychiatric:         Mood and Affect: Mood normal.         Behavior: Behavior normal.         Thought Content: Thought content normal.         Judgment: Judgment normal.     ASSESSMENT/PLAN    ICD-10-CM    1. Type 2 diabetes mellitus with microalbuminuria, with long-term current use of insulin ([facility])  E11.29 Amb Referral to Nutrition Services    R80.9     Z79.4       2.   Z63.4 Amb Referral to Nutrition Services      3.  Essential hypertension  I10 Increase dose to: losartan (COZAAR) 50 MG tablet

## 2023-08-16 ENCOUNTER — OFFICE VISIT (OUTPATIENT)
Age: 83
End: 2023-08-16
Payer: MEDICARE

## 2023-08-16 VITALS
WEIGHT: 169.6 LBS | BODY MASS INDEX: 28.22 KG/M2 | SYSTOLIC BLOOD PRESSURE: 133 MMHG | DIASTOLIC BLOOD PRESSURE: 71 MMHG | TEMPERATURE: 97.3 F | HEART RATE: 85 BPM | OXYGEN SATURATION: 98 %

## 2023-08-16 DIAGNOSIS — I10 ESSENTIAL HYPERTENSION: ICD-10-CM

## 2023-08-16 PROCEDURE — 99213 OFFICE O/P EST LOW 20 MIN: CPT | Performed by: PHYSICIAN ASSISTANT

## 2023-08-16 RX ORDER — LOSARTAN POTASSIUM 50 MG/1
50 TABLET ORAL DAILY
Qty: 90 TABLET | Refills: 1 | Status: SHIPPED | OUTPATIENT
Start: 2023-08-16

## 2023-08-16 ASSESSMENT — ENCOUNTER SYMPTOMS
SHORTNESS OF BREATH: 0
COUGH: 0

## 2023-09-11 RX ORDER — AMLODIPINE BESYLATE 5 MG/1
TABLET ORAL
Qty: 90 TABLET | Refills: 1 | OUTPATIENT
Start: 2023-09-11

## 2023-11-08 NOTE — TELEPHONE ENCOUNTER
Patient requesting a medication refill. insulin glargine (LANTUS SOLOSTAR) 100 UNIT/ML injection pen [[id number]]     Order Details     Also needs pen needles.

## 2023-11-09 RX ORDER — INSULIN GLARGINE 100 [IU]/ML
INJECTION, SOLUTION SUBCUTANEOUS
Qty: 30 ML | Refills: 10 | Status: SHIPPED | OUTPATIENT
Start: 2023-11-09

## 2023-12-11 RX ORDER — ISOPROPYL ALCOHOL 70 ML/100ML
SWAB TOPICAL
Qty: 200 EACH | Refills: 3 | Status: SHIPPED | OUTPATIENT
Start: 2023-12-11

## 2023-12-11 NOTE — TELEPHONE ENCOUNTER
Chief Complaint   Patient presents with    Medication Refill       Requested Prescriptions     Pending Prescriptions Disp Refills    Alcohol Swabs (DROPSAFE ALCOHOL PREP) 70 % PADS [Pharmacy Med Name: DROPSAFE ALCOHOL PREP PADS 70 % Pad]  3     Sig: USE TWO TIMES DAILY AS DIRECTED       Allergies:   Allergies   Allergen Reactions    Ace Inhibitors Nausea Only       Last visit with clinic:  8/16/2023   Next visit with clinic: Visit date not found     Last visit with this provider: 8/16/2023   Next Visit with this provider: Visit date not found    Signed by [facility]  12/11/23  11:50 AM

## 2024-01-16 RX ORDER — BLOOD SUGAR DIAGNOSTIC
STRIP MISCELLANEOUS
Qty: 200 STRIP | Refills: 3 | Status: SHIPPED | OUTPATIENT
Start: 2024-01-16

## 2024-02-16 DIAGNOSIS — I10 ESSENTIAL HYPERTENSION: ICD-10-CM

## 2024-02-16 NOTE — TELEPHONE ENCOUNTER
Refill request received from [facility] for   Requested Prescriptions     Pending Prescriptions Disp Refills    losartan (COZAAR) 50 MG tablet [Pharmacy Med Name: LOSARTAN POTASSIUM 50 MG Tablet] 90 tablet 3     Sig: TAKE 1 TABLET EVERY DAY    metFORMIN (GLUCOPHAGE-XR) 500 MG extended release tablet [Pharmacy Med Name: METFORMIN HYDROCHLORIDE  MG Tablet Extended Release 24 Hour] 90 tablet 3     Sig: TAKE 1 TABLET EVERY DAY WITH DINNER     Last appointment: 8/16/2023   Next appointment: Visit date not found     Routed to JESUS Rodriguez for review.

## 2024-02-17 ENCOUNTER — APPOINTMENT (OUTPATIENT)
Facility: HOSPITAL | Age: 84
End: 2024-02-17
Payer: MEDICARE

## 2024-02-17 ENCOUNTER — HOSPITAL ENCOUNTER (EMERGENCY)
Facility: HOSPITAL | Age: 84
Discharge: HOME | End: 2024-02-17
Attending: STUDENT IN AN ORGANIZED HEALTH CARE EDUCATION/TRAINING PROGRAM
Payer: MEDICARE

## 2024-02-17 VITALS
TEMPERATURE: 98.5 F | DIASTOLIC BLOOD PRESSURE: 75 MMHG | OXYGEN SATURATION: 96 % | WEIGHT: 170 LBS | HEIGHT: 65 IN | HEART RATE: 79 BPM | BODY MASS INDEX: 28.32 KG/M2 | RESPIRATION RATE: 20 BRPM | SYSTOLIC BLOOD PRESSURE: 164 MMHG

## 2024-02-17 DIAGNOSIS — R10.84 GENERALIZED ABDOMINAL PAIN: Primary | ICD-10-CM

## 2024-02-17 LAB
ALBUMIN SERPL-MCNC: 4.4 G/DL (ref 3.5–5.2)
ALBUMIN/GLOB SERPL: 1.6 (ref 1.1–2.2)
ALP SERPL-CCNC: 117 U/L (ref 35–104)
ALT SERPL-CCNC: 29 U/L (ref 10–35)
ANION GAP SERPL CALC-SCNC: 13 MMOL/L (ref 5–15)
APPEARANCE UR: CLEAR
AST SERPL-CCNC: 25 U/L (ref 10–35)
BACTERIA URNS QL MICRO: NEGATIVE /HPF
BASOPHILS # BLD: 0 K/UL (ref 0–1)
BASOPHILS NFR BLD: 0 % (ref 0–1)
BILIRUB SERPL-MCNC: 0.6 MG/DL (ref 0.2–1)
BILIRUB UR QL: NEGATIVE
BUN SERPL-MCNC: 11 MG/DL (ref 8–23)
BUN/CREAT SERPL: 15 (ref 12–20)
CALCIUM SERPL-MCNC: 9.4 MG/DL (ref 8.8–10.2)
CHLORIDE SERPL-SCNC: 98 MMOL/L (ref 98–107)
CO2 SERPL-SCNC: 24 MMOL/L (ref 22–29)
COLOR UR: ABNORMAL
COMMENT:: NORMAL
CREAT SERPL-MCNC: 0.75 MG/DL (ref 0.5–0.9)
DIFFERENTIAL METHOD BLD: ABNORMAL
EKG ATRIAL RATE: 93 BPM
EKG DIAGNOSIS: NORMAL
EKG P AXIS: 80 DEGREES
EKG P-R INTERVAL: 176 MS
EKG Q-T INTERVAL: 346 MS
EKG QRS DURATION: 82 MS
EKG QTC CALCULATION (BAZETT): 430 MS
EKG R AXIS: -26 DEGREES
EKG T AXIS: 87 DEGREES
EKG VENTRICULAR RATE: 93 BPM
EOSINOPHIL # BLD: 0 K/UL (ref 0–0.4)
EOSINOPHIL NFR BLD: 0 %
EPITH CASTS URNS QL MICRO: ABNORMAL /LPF
ERYTHROCYTE [DISTWIDTH] IN BLOOD BY AUTOMATED COUNT: 12.3 % (ref 11.5–14.5)
GLOBULIN SER CALC-MCNC: 2.8 G/DL (ref 2–4)
GLUCOSE SERPL-MCNC: 308 MG/DL (ref 65–100)
GLUCOSE UR STRIP.AUTO-MCNC: 500 MG/DL
HCT VFR BLD AUTO: 40.5 % (ref 35–47)
HGB BLD-MCNC: 13.6 G/DL (ref 11.5–16)
HGB UR QL STRIP: ABNORMAL
IMM GRANULOCYTES # BLD AUTO: 0 K/UL (ref 0–0.04)
IMM GRANULOCYTES NFR BLD AUTO: 1 % (ref 0–0.5)
KETONES UR QL STRIP.AUTO: NEGATIVE MG/DL
LACTATE BLD-SCNC: 1.41 MMOL/L (ref 0.4–2)
LACTATE BLD-SCNC: 2.12 MMOL/L (ref 0.4–2)
LACTATE SERPL-SCNC: 2.4 MMOL/L (ref 0.4–2)
LEUKOCYTE ESTERASE UR QL STRIP.AUTO: NEGATIVE
LYMPHOCYTES # BLD: 1 K/UL (ref 0.8–3.5)
LYMPHOCYTES NFR BLD: 15 % (ref 12–49)
MCH RBC QN AUTO: 29.9 PG (ref 26–34)
MCHC RBC AUTO-ENTMCNC: 33.6 G/DL (ref 30–36.5)
MCV RBC AUTO: 89 FL (ref 80–99)
MONOCYTES # BLD: 0.3 K/UL (ref 0–1)
MONOCYTES NFR BLD: 4 % (ref 5–13)
NEUTS SEG # BLD: 5.6 K/UL (ref 1.8–8)
NEUTS SEG NFR BLD: 80 % (ref 32–75)
NITRITE UR QL STRIP.AUTO: NEGATIVE
NRBC # BLD: 0 K/UL (ref 0–0.01)
NRBC BLD-RTO: 0 PER 100 WBC
PH UR STRIP: 6.5 (ref 5–8)
PLATELET # BLD AUTO: 158 K/UL (ref 150–400)
PMV BLD AUTO: 10.6 FL (ref 8.9–12.9)
POTASSIUM SERPL-SCNC: 4.1 MMOL/L (ref 3.5–5.1)
PROT SERPL-MCNC: 7.2 G/DL (ref 6.4–8.3)
PROT UR STRIP-MCNC: NEGATIVE MG/DL
RBC # BLD AUTO: 4.55 M/UL (ref 3.8–5.2)
RBC #/AREA URNS HPF: ABNORMAL /HPF
SODIUM SERPL-SCNC: 135 MMOL/L (ref 136–145)
SP GR UR REFRACTOMETRY: 1.01 (ref 1–1.03)
SPECIMEN HOLD: NORMAL
SPECIMEN HOLD: NORMAL
TROPONIN I BLD-MCNC: <0.04 NG/ML (ref 0–0.08)
UROBILINOGEN UR QL STRIP.AUTO: 0.2 EU/DL (ref 0.2–1)
WBC # BLD AUTO: 7 K/UL (ref 3.6–11)
WBC URNS QL MICRO: ABNORMAL /HPF (ref 0–4)

## 2024-02-17 PROCEDURE — 6360000004 HC RX CONTRAST MEDICATION: Performed by: STUDENT IN AN ORGANIZED HEALTH CARE EDUCATION/TRAINING PROGRAM

## 2024-02-17 PROCEDURE — 2580000003 HC RX 258: Performed by: STUDENT IN AN ORGANIZED HEALTH CARE EDUCATION/TRAINING PROGRAM

## 2024-02-17 PROCEDURE — 85025 COMPLETE CBC W/AUTO DIFF WBC: CPT

## 2024-02-17 PROCEDURE — 83605 ASSAY OF LACTIC ACID: CPT

## 2024-02-17 PROCEDURE — 96374 THER/PROPH/DIAG INJ IV PUSH: CPT

## 2024-02-17 PROCEDURE — 96375 TX/PRO/DX INJ NEW DRUG ADDON: CPT

## 2024-02-17 PROCEDURE — 84484 ASSAY OF TROPONIN QUANT: CPT

## 2024-02-17 PROCEDURE — 76705 ECHO EXAM OF ABDOMEN: CPT

## 2024-02-17 PROCEDURE — 6360000002 HC RX W HCPCS: Performed by: STUDENT IN AN ORGANIZED HEALTH CARE EDUCATION/TRAINING PROGRAM

## 2024-02-17 PROCEDURE — 74177 CT ABD & PELVIS W/CONTRAST: CPT

## 2024-02-17 PROCEDURE — 81001 URINALYSIS AUTO W/SCOPE: CPT

## 2024-02-17 PROCEDURE — 99285 EMERGENCY DEPT VISIT HI MDM: CPT

## 2024-02-17 PROCEDURE — 36415 COLL VENOUS BLD VENIPUNCTURE: CPT

## 2024-02-17 PROCEDURE — 93010 ELECTROCARDIOGRAM REPORT: CPT | Performed by: INTERNAL MEDICINE

## 2024-02-17 PROCEDURE — 80053 COMPREHEN METABOLIC PANEL: CPT

## 2024-02-17 PROCEDURE — 96361 HYDRATE IV INFUSION ADD-ON: CPT

## 2024-02-17 PROCEDURE — 93005 ELECTROCARDIOGRAM TRACING: CPT | Performed by: STUDENT IN AN ORGANIZED HEALTH CARE EDUCATION/TRAINING PROGRAM

## 2024-02-17 RX ORDER — PROCHLORPERAZINE EDISYLATE 5 MG/ML
5 INJECTION INTRAMUSCULAR; INTRAVENOUS ONCE
Status: COMPLETED | OUTPATIENT
Start: 2024-02-17 | End: 2024-02-17

## 2024-02-17 RX ORDER — POLYETHYLENE GLYCOL 3350 17 G/17G
17 POWDER, FOR SOLUTION ORAL DAILY
Qty: 1530 G | Refills: 1 | Status: SHIPPED | OUTPATIENT
Start: 2024-02-17

## 2024-02-17 RX ORDER — DIPHENHYDRAMINE HYDROCHLORIDE 50 MG/ML
25 INJECTION INTRAMUSCULAR; INTRAVENOUS ONCE
Status: COMPLETED | OUTPATIENT
Start: 2024-02-17 | End: 2024-02-17

## 2024-02-17 RX ORDER — SODIUM CHLORIDE, SODIUM LACTATE, POTASSIUM CHLORIDE, AND CALCIUM CHLORIDE .6; .31; .03; .02 G/100ML; G/100ML; G/100ML; G/100ML
500 INJECTION, SOLUTION INTRAVENOUS
Status: COMPLETED | OUTPATIENT
Start: 2024-02-17 | End: 2024-02-17

## 2024-02-17 RX ORDER — ONDANSETRON 2 MG/ML
4 INJECTION INTRAMUSCULAR; INTRAVENOUS EVERY 6 HOURS PRN
Status: DISCONTINUED | OUTPATIENT
Start: 2024-02-17 | End: 2024-02-17 | Stop reason: HOSPADM

## 2024-02-17 RX ORDER — MORPHINE SULFATE 4 MG/ML
4 INJECTION, SOLUTION INTRAMUSCULAR; INTRAVENOUS
Status: COMPLETED | OUTPATIENT
Start: 2024-02-17 | End: 2024-02-17

## 2024-02-17 RX ADMIN — IOPAMIDOL 100 ML: 755 INJECTION, SOLUTION INTRAVENOUS at 10:47

## 2024-02-17 RX ADMIN — SODIUM CHLORIDE, POTASSIUM CHLORIDE, SODIUM LACTATE AND CALCIUM CHLORIDE 500 ML: 600; 310; 30; 20 INJECTION, SOLUTION INTRAVENOUS at 10:30

## 2024-02-17 RX ADMIN — DIPHENHYDRAMINE HYDROCHLORIDE 25 MG: 50 INJECTION INTRAMUSCULAR; INTRAVENOUS at 12:14

## 2024-02-17 RX ADMIN — SODIUM CHLORIDE, POTASSIUM CHLORIDE, SODIUM LACTATE AND CALCIUM CHLORIDE 500 ML: 600; 310; 30; 20 INJECTION, SOLUTION INTRAVENOUS at 14:06

## 2024-02-17 RX ADMIN — MORPHINE SULFATE 4 MG: 4 INJECTION INTRAVENOUS at 10:30

## 2024-02-17 RX ADMIN — ONDANSETRON 4 MG: 2 INJECTION INTRAMUSCULAR; INTRAVENOUS at 11:31

## 2024-02-17 RX ADMIN — PROCHLORPERAZINE EDISYLATE 5 MG: 5 INJECTION INTRAMUSCULAR; INTRAVENOUS at 12:13

## 2024-02-17 ASSESSMENT — PAIN - FUNCTIONAL ASSESSMENT
PAIN_FUNCTIONAL_ASSESSMENT: 0-10

## 2024-02-17 ASSESSMENT — PAIN DESCRIPTION - DESCRIPTORS: DESCRIPTORS: ACHING

## 2024-02-17 ASSESSMENT — PAIN SCALES - GENERAL
PAINLEVEL_OUTOF10: 8
PAINLEVEL_OUTOF10: 10
PAINLEVEL_OUTOF10: 6

## 2024-02-17 ASSESSMENT — PAIN DESCRIPTION - ORIENTATION: ORIENTATION: ANTERIOR

## 2024-02-17 ASSESSMENT — LIFESTYLE VARIABLES
HOW MANY STANDARD DRINKS CONTAINING ALCOHOL DO YOU HAVE ON A TYPICAL DAY: PATIENT DOES NOT DRINK
HOW OFTEN DO YOU HAVE A DRINK CONTAINING ALCOHOL: NEVER

## 2024-02-17 ASSESSMENT — PAIN DESCRIPTION - LOCATION: LOCATION: ABDOMEN

## 2024-02-17 NOTE — ED TRIAGE NOTES
Pt ambulatory into ER with cc of bilateral lower back pain and generalized abdominal pain since Friday. Pt denies direct injury, dysuria, urinary symptoms or n/v/d. Most recent BM yesterday.    Pt reports taking tylenol around 0200 without relief.

## 2024-02-17 NOTE — ED NOTES
Pt given discharge instructions, patient education, 2 prescriptions and follow up information. Pt verbalizes understanding. All questions answered. Pt discharged to home in private vehicle, wheelchair. Pt A&Ox4, RA, pain controlled.

## 2024-02-17 NOTE — ED PROVIDER NOTES
[facility]PT  EMERGENCY DEPARTMENT ENCOUNTER      Pt Name: Valerie Torres  MRN: [id number]  Birthdate 1940  Date of evaluation: 2/17/2024  Provider: Sari Oseguera DO    CHIEF COMPLAINT       Chief Complaint   Patient presents with    Abdominal Pain    Back Pain       PMH   Past Medical History:   Diagnosis Date    Diabetes (HCC)     Fatty liver     GERD (gastroesophageal reflux disease)     Hypercholesterolemia     Osteopenia 2010    White coat hypertension          MDM:   Vitals:    Vitals:    02/17/24 1549   BP:    Pulse: 79   Resp: 20   Temp:    SpO2: 96%           This is a 83 y.o. female with pmhx diabetes, hypertension, hyperlipidemia who presents today for cc of abdominal pain.  Patient states that over the last few days she has noted low back pain and diffuse abdominal pain that she rates a 10 out of 10 on the pain scale.  She states she feels very bloated and the pain is cramping in nature, nonradiating.  Her last BM was yesterday and she felt it was normal.  She denies any nausea, vomiting, urinary symptoms, fever or chills.  No chest pain or dyspnea.    On arrival VS include some hypertension, otherwise stable.   Physical Exam  General: Alert, no acute distress  HEENT: Normocephalic, atraumatic. EOMI, moist oral mucosa, no conjunctival injection  Neck: ROM normal, supple  Cardio: Heart regular rate and rhythm, cap refill <2seconds  Lungs: No respiratory distress, no wheezing, CTAB  Abdomen: Soft, distended, diffusely tender without rebound or guarding.  MSK: ROM normal, no LE edema  Skin: Warm, dry, no rash  Neuro: No focal neurodeficits, Aox3    Labs remarkable for mild elevation in lactic acid, otherwise stable.  CT of the abdomen pelvis and ultrasound the gallbladder negative process.    Patient did have 1 episode of emesis here, given pain control and Zofran.  Repeat lactic after the episode of emesis was slightly elevated, resolved after some fluids. Had negative troponin and

## 2024-02-19 RX ORDER — METFORMIN HYDROCHLORIDE 500 MG/1
TABLET, EXTENDED RELEASE ORAL
Qty: 90 TABLET | Refills: 0 | Status: SHIPPED | OUTPATIENT
Start: 2024-02-19

## 2024-02-19 RX ORDER — LOSARTAN POTASSIUM 50 MG/1
50 TABLET ORAL DAILY
Qty: 90 TABLET | Refills: 0 | Status: SHIPPED | OUTPATIENT
Start: 2024-02-19

## 2024-04-16 ENCOUNTER — OFFICE VISIT (OUTPATIENT)
Age: 84
End: 2024-04-16
Payer: MEDICARE

## 2024-04-16 VITALS
TEMPERATURE: 98.7 F | HEIGHT: 65 IN | DIASTOLIC BLOOD PRESSURE: 65 MMHG | BODY MASS INDEX: 28.32 KG/M2 | OXYGEN SATURATION: 98 % | SYSTOLIC BLOOD PRESSURE: 145 MMHG | RESPIRATION RATE: 18 BRPM | WEIGHT: 170 LBS | HEART RATE: 79 BPM

## 2024-04-16 DIAGNOSIS — R80.9 TYPE 2 DIABETES MELLITUS WITH MICROALBUMINURIA, WITH LONG-TERM CURRENT USE OF INSULIN (HCC): ICD-10-CM

## 2024-04-16 DIAGNOSIS — I10 ESSENTIAL HYPERTENSION: Primary | ICD-10-CM

## 2024-04-16 DIAGNOSIS — E78.00 HYPERCHOLESTEREMIA: ICD-10-CM

## 2024-04-16 DIAGNOSIS — Z79.4 TYPE 2 DIABETES MELLITUS WITH MICROALBUMINURIA, WITH LONG-TERM CURRENT USE OF INSULIN (HCC): ICD-10-CM

## 2024-04-16 DIAGNOSIS — Z63.4 WIDOWED: ICD-10-CM

## 2024-04-16 DIAGNOSIS — E11.29 TYPE 2 DIABETES MELLITUS WITH MICROALBUMINURIA, WITH LONG-TERM CURRENT USE OF INSULIN (HCC): ICD-10-CM

## 2024-04-16 DIAGNOSIS — F43.21 COMPLICATED GRIEF: ICD-10-CM

## 2024-04-16 LAB
ALBUMIN SERPL-MCNC: 3.9 G/DL (ref 3.5–5)
ALBUMIN/GLOB SERPL: 1.1 (ref 1.1–2.2)
ALP SERPL-CCNC: 104 U/L (ref 45–117)
ALT SERPL-CCNC: 41 U/L (ref 12–78)
ANION GAP SERPL CALC-SCNC: 4 MMOL/L (ref 5–15)
AST SERPL-CCNC: 21 U/L (ref 15–37)
BILIRUB SERPL-MCNC: 0.7 MG/DL (ref 0.2–1)
BUN SERPL-MCNC: 16 MG/DL (ref 6–20)
BUN/CREAT SERPL: 17 (ref 12–20)
CALCIUM SERPL-MCNC: 10 MG/DL (ref 8.5–10.1)
CHLORIDE SERPL-SCNC: 105 MMOL/L (ref 97–108)
CHOLEST SERPL-MCNC: 234 MG/DL
CO2 SERPL-SCNC: 28 MMOL/L (ref 21–32)
CREAT SERPL-MCNC: 0.92 MG/DL (ref 0.55–1.02)
CREAT UR-MCNC: 94.3 MG/DL
EST. AVERAGE GLUCOSE BLD GHB EST-MCNC: 148 MG/DL
GLOBULIN SER CALC-MCNC: 3.4 G/DL (ref 2–4)
GLUCOSE SERPL-MCNC: 167 MG/DL (ref 65–100)
HBA1C MFR BLD: 6.8 % (ref 4–5.6)
HDLC SERPL-MCNC: 54 MG/DL
HDLC SERPL: 4.3 (ref 0–5)
LDLC SERPL CALC-MCNC: 126.8 MG/DL (ref 0–100)
MICROALBUMIN UR-MCNC: 2.14 MG/DL
MICROALBUMIN/CREAT UR-RTO: 23 MG/G (ref 0–30)
POTASSIUM SERPL-SCNC: 4.3 MMOL/L (ref 3.5–5.1)
PROT SERPL-MCNC: 7.3 G/DL (ref 6.4–8.2)
SODIUM SERPL-SCNC: 137 MMOL/L (ref 136–145)
TRIGL SERPL-MCNC: 266 MG/DL
VLDLC SERPL CALC-MCNC: 53.2 MG/DL

## 2024-04-16 PROCEDURE — G8417 CALC BMI ABV UP PARAM F/U: HCPCS | Performed by: PHYSICIAN ASSISTANT

## 2024-04-16 PROCEDURE — 3077F SYST BP >= 140 MM HG: CPT | Performed by: PHYSICIAN ASSISTANT

## 2024-04-16 PROCEDURE — 1090F PRES/ABSN URINE INCON ASSESS: CPT | Performed by: PHYSICIAN ASSISTANT

## 2024-04-16 PROCEDURE — 99214 OFFICE O/P EST MOD 30 MIN: CPT | Performed by: PHYSICIAN ASSISTANT

## 2024-04-16 PROCEDURE — 1036F TOBACCO NON-USER: CPT | Performed by: PHYSICIAN ASSISTANT

## 2024-04-16 PROCEDURE — G8399 PT W/DXA RESULTS DOCUMENT: HCPCS | Performed by: PHYSICIAN ASSISTANT

## 2024-04-16 PROCEDURE — 1123F ACP DISCUSS/DSCN MKR DOCD: CPT | Performed by: PHYSICIAN ASSISTANT

## 2024-04-16 PROCEDURE — 3078F DIAST BP <80 MM HG: CPT | Performed by: PHYSICIAN ASSISTANT

## 2024-04-16 PROCEDURE — G8427 DOCREV CUR MEDS BY ELIG CLIN: HCPCS | Performed by: PHYSICIAN ASSISTANT

## 2024-04-16 PROCEDURE — G2211 COMPLEX E/M VISIT ADD ON: HCPCS | Performed by: PHYSICIAN ASSISTANT

## 2024-04-16 RX ORDER — AMLODIPINE BESYLATE 10 MG/1
10 TABLET ORAL DAILY
Qty: 90 TABLET | Refills: 1 | Status: SHIPPED | OUTPATIENT
Start: 2024-04-16

## 2024-04-16 RX ORDER — METFORMIN HYDROCHLORIDE 500 MG/1
TABLET, EXTENDED RELEASE ORAL
Qty: 90 TABLET | Refills: 1 | Status: SHIPPED | OUTPATIENT
Start: 2024-04-16

## 2024-04-16 RX ORDER — LOSARTAN POTASSIUM 100 MG/1
100 TABLET ORAL DAILY
Qty: 90 TABLET | Refills: 1 | Status: SHIPPED | OUTPATIENT
Start: 2024-04-16

## 2024-04-16 RX ORDER — ATORVASTATIN CALCIUM 80 MG/1
80 TABLET, FILM COATED ORAL DAILY
Qty: 90 TABLET | Refills: 1 | Status: SHIPPED | OUTPATIENT
Start: 2024-04-16

## 2024-04-16 RX ORDER — INSULIN GLARGINE 100 [IU]/ML
INJECTION, SOLUTION SUBCUTANEOUS
Qty: 30 ML | Refills: 1 | Status: SHIPPED | OUTPATIENT
Start: 2024-04-16

## 2024-04-16 SDOH — ECONOMIC STABILITY: HOUSING INSECURITY
IN THE LAST 12 MONTHS, WAS THERE A TIME WHEN YOU DID NOT HAVE A STEADY PLACE TO SLEEP OR SLEPT IN A SHELTER (INCLUDING NOW)?: NO

## 2024-04-16 SDOH — ECONOMIC STABILITY: FOOD INSECURITY: WITHIN THE PAST 12 MONTHS, YOU WORRIED THAT YOUR FOOD WOULD RUN OUT BEFORE YOU GOT MONEY TO BUY MORE.: NEVER TRUE

## 2024-04-16 SDOH — ECONOMIC STABILITY: INCOME INSECURITY: HOW HARD IS IT FOR YOU TO PAY FOR THE VERY BASICS LIKE FOOD, HOUSING, MEDICAL CARE, AND HEATING?: NOT HARD AT ALL

## 2024-04-16 SDOH — SOCIAL STABILITY - SOCIAL INSECURITY: DISSAPEARANCE AND DEATH OF FAMILY MEMBER: Z63.4

## 2024-04-16 SDOH — ECONOMIC STABILITY: FOOD INSECURITY: WITHIN THE PAST 12 MONTHS, THE FOOD YOU BOUGHT JUST DIDN'T LAST AND YOU DIDN'T HAVE MONEY TO GET MORE.: NEVER TRUE

## 2024-04-16 ASSESSMENT — PATIENT HEALTH QUESTIONNAIRE - PHQ9
SUM OF ALL RESPONSES TO PHQ QUESTIONS 1-9: 0
SUM OF ALL RESPONSES TO PHQ QUESTIONS 1-9: 0
SUM OF ALL RESPONSES TO PHQ9 QUESTIONS 1 & 2: 0
SUM OF ALL RESPONSES TO PHQ QUESTIONS 1-9: 0
1. LITTLE INTEREST OR PLEASURE IN DOING THINGS: NOT AT ALL
2. FEELING DOWN, DEPRESSED OR HOPELESS: NOT AT ALL
SUM OF ALL RESPONSES TO PHQ QUESTIONS 1-9: 0

## 2024-04-16 ASSESSMENT — ENCOUNTER SYMPTOMS
SHORTNESS OF BREATH: 0
COUGH: 0

## 2024-04-16 NOTE — PROGRESS NOTES
I have reviewed all needed documentation in preparation for visit. Verified patient by name and date of birth  Chief Complaint   Patient presents with    Follow-up     No concerns        Vitals:    04/16/24 0931   BP: (!) 145/65   Site: Right Upper Arm   Position: Sitting   Cuff Size: Medium Adult   Pulse: 79   Resp: 18   Temp: 98.7 °F (37.1 °C)   TempSrc: Temporal   SpO2: 98%   Weight: 77.1 kg (170 lb)   Height: 1.651 m (5' 5\")       Health Maintenance Due   Topic Date Due    Respiratory Syncytial Virus (RSV) Pregnant or age 60 yrs+ (1 - 1-dose 60+ series) Never done    Shingles vaccine (2 of 3) 11/26/2013    Diabetic retinal exam  05/22/2020    COVID-19 Vaccine (4 - 2023-24 season) 09/01/2023    A1C test (Diabetic or Prediabetic)  10/17/2023    Annual Wellness Visit (Medicare Advantage)  Never done    Lipids  01/18/2024    Depression Screen  04/17/2024     \"Have you been to the ER, urgent care clinic since your last visit?  Hospitalized since your last visit?\"    YES    “Have you seen or consulted any other health care providers outside of [facility] since your last visit?”    NO            Click Here for Release of Records Request         Marixa le CMA  
injection pen    R80.9 metFORMIN (GLUCOPHAGE-XR) 500 MG extended release tablet    Z79.4 Hemoglobin A1C     Comprehensive Metabolic Panel     Microalbumin / Creatinine Urine Ratio      3.   Z63.4 Amb referral to Counseling Services      5. Complicated grief  F43.21 Amb referral to Counseling Services      6. Hypercholesteremia  E78.00 Lipid Panel         Refills sent

## 2024-05-06 DIAGNOSIS — E11.29 TYPE 2 DIABETES MELLITUS WITH OTHER DIABETIC KIDNEY COMPLICATION (HCC): ICD-10-CM

## 2024-05-06 RX ORDER — PEN NEEDLE, DIABETIC 32GX 5/32"
NEEDLE, DISPOSABLE MISCELLANEOUS
Qty: 100 EACH | Refills: 2 | Status: SHIPPED | OUTPATIENT
Start: 2024-05-06

## 2024-05-07 ENCOUNTER — OFFICE VISIT (OUTPATIENT)
Age: 84
End: 2024-05-07
Payer: MEDICARE

## 2024-05-07 VITALS
OXYGEN SATURATION: 97 % | RESPIRATION RATE: 18 BRPM | DIASTOLIC BLOOD PRESSURE: 72 MMHG | HEART RATE: 71 BPM | SYSTOLIC BLOOD PRESSURE: 129 MMHG | BODY MASS INDEX: 28.66 KG/M2 | WEIGHT: 172 LBS | HEIGHT: 65 IN | TEMPERATURE: 97.8 F

## 2024-05-07 DIAGNOSIS — I10 ESSENTIAL HYPERTENSION: Primary | ICD-10-CM

## 2024-05-07 DIAGNOSIS — F43.21 COMPLICATED GRIEF: ICD-10-CM

## 2024-05-07 DIAGNOSIS — E78.00 HYPERCHOLESTEREMIA: ICD-10-CM

## 2024-05-07 PROCEDURE — 99214 OFFICE O/P EST MOD 30 MIN: CPT | Performed by: PHYSICIAN ASSISTANT

## 2024-05-07 PROCEDURE — G8427 DOCREV CUR MEDS BY ELIG CLIN: HCPCS | Performed by: PHYSICIAN ASSISTANT

## 2024-05-07 PROCEDURE — 1036F TOBACCO NON-USER: CPT | Performed by: PHYSICIAN ASSISTANT

## 2024-05-07 PROCEDURE — 3074F SYST BP LT 130 MM HG: CPT | Performed by: PHYSICIAN ASSISTANT

## 2024-05-07 PROCEDURE — G8399 PT W/DXA RESULTS DOCUMENT: HCPCS | Performed by: PHYSICIAN ASSISTANT

## 2024-05-07 PROCEDURE — 1123F ACP DISCUSS/DSCN MKR DOCD: CPT | Performed by: PHYSICIAN ASSISTANT

## 2024-05-07 PROCEDURE — G2211 COMPLEX E/M VISIT ADD ON: HCPCS | Performed by: PHYSICIAN ASSISTANT

## 2024-05-07 PROCEDURE — G8417 CALC BMI ABV UP PARAM F/U: HCPCS | Performed by: PHYSICIAN ASSISTANT

## 2024-05-07 PROCEDURE — 1090F PRES/ABSN URINE INCON ASSESS: CPT | Performed by: PHYSICIAN ASSISTANT

## 2024-05-07 PROCEDURE — 3078F DIAST BP <80 MM HG: CPT | Performed by: PHYSICIAN ASSISTANT

## 2024-05-07 RX ORDER — MULTIVIT WITH CALCIUM,IRON,MIN
1 TABLET ORAL DAILY
COMMUNITY

## 2024-05-07 ASSESSMENT — ENCOUNTER SYMPTOMS
SHORTNESS OF BREATH: 0
COUGH: 0

## 2024-05-07 NOTE — PROGRESS NOTES
Valerie Torres is a 83 y.o. female  Chief Complaint   Patient presents with    Follow-up     No concerns          Vitals:    05/07/24 1057   BP: 129/72   Pulse: 71   Resp: 18   Temp: 97.8 °F (36.6 °C)   SpO2: 97%      Wt Readings from Last 3 Encounters:   05/07/24 78 kg (172 lb)   04/16/24 77.1 kg (170 lb)   02/17/24 77.1 kg (170 lb)     BMI Readings from Last 3 Encounters:   05/07/24 28.62 kg/m²   04/16/24 28.29 kg/m²   02/17/24 28.29 kg/m²     Health Maintenance Due   Topic Date Due    Respiratory Syncytial Virus (RSV) Pregnant or age 60 yrs+ (1 - 1-dose 60+ series) Never done    Shingles vaccine (2 of 3) 11/26/2013    Diabetic retinal exam  05/22/2020    COVID-19 Vaccine (4 - 2023-24 season) 09/01/2023    Annual Wellness Visit (Medicare Advantage)  Never done     HPI    CV follow up  BP controlled: misunderstood directions and now only taking Losartan 100 mg but BP here and at  home are normal!   BP Readings from Last 3 Encounters:   05/07/24 129/72   04/16/24 (!) 145/65   02/17/24 (!) 164/75     Currently taking:   Hypertension Medications       Angiotensin II Receptor Antagonists       losartan (COZAAR) 100 MG tablet Take 1 tablet by mouth daily            Creatinine (MG/DL)   Date Value   04/16/2024 0.92      Cholesterol Meds  atorvastatin - 80 MG  Lab Results   Component Value Date    .8 (H) 04/16/2024     LDL borderline controlled at last check. On max Lipitor dose.     Complicated grief - hasn't yet scheduled counseling. Still thinking about it. Has referral info.     ROS  Review of Systems   Constitutional:  Negative for fever.   Respiratory:  Negative for cough and shortness of breath.    Cardiovascular:  Negative for chest pain and palpitations.   Neurological:  Negative for headaches.   Psychiatric/Behavioral:  Negative for dysphoric mood.      EXAM  Physical Exam  Vitals and nursing note reviewed.   Constitutional:       Appearance: Normal appearance.   HENT:      Head: Normocephalic and

## 2024-05-07 NOTE — PROGRESS NOTES
I have reviewed all needed documentation in preparation for visit. Verified patient by name and date of birth  Chief Complaint   Patient presents with    Follow-up     No concerns          Vitals:    05/07/24 1057   BP: 129/72   Site: Right Upper Arm   Position: Sitting   Cuff Size: Medium Adult   Pulse: 71   Resp: 18   Temp: 97.8 °F (36.6 °C)   TempSrc: Temporal   SpO2: 97%   Weight: 78 kg (172 lb)   Height: 1.651 m (5' 5\")       Health Maintenance Due   Topic Date Due    Respiratory Syncytial Virus (RSV) Pregnant or age 60 yrs+ (1 - 1-dose 60+ series) Never done    Shingles vaccine (2 of 3) 11/26/2013    Diabetic retinal exam  05/22/2020    COVID-19 Vaccine (4 - 2023-24 season) 09/01/2023    Annual Wellness Visit (Medicare Advantage)  Never done     \"Have you been to the ER, urgent care clinic since your last visit?  Hospitalized since your last visit?\"    NO    “Have you seen or consulted any other health care providers outside of [facility] since your last visit?”    NO            Click Here for Release of Records Request         Marixa le CMA

## 2024-07-30 ENCOUNTER — TELEPHONE (OUTPATIENT)
Age: 84
End: 2024-07-30

## 2024-07-30 NOTE — TELEPHONE ENCOUNTER
Left vm informing patient that provider will be out of the office the week of 11/08/24 and their appointment for that day has to be rescheduled. Left number for patient to return call to reschedule their appointment at their earliest convenience.

## 2024-08-30 ENCOUNTER — TELEPHONE (OUTPATIENT)
Age: 84
End: 2024-08-30

## 2024-08-30 DIAGNOSIS — Z79.4 TYPE 2 DIABETES MELLITUS WITH MICROALBUMINURIA, WITH LONG-TERM CURRENT USE OF INSULIN (HCC): Primary | ICD-10-CM

## 2024-08-30 DIAGNOSIS — R80.9 TYPE 2 DIABETES MELLITUS WITH MICROALBUMINURIA, WITH LONG-TERM CURRENT USE OF INSULIN (HCC): Primary | ICD-10-CM

## 2024-08-30 DIAGNOSIS — E11.29 TYPE 2 DIABETES MELLITUS WITH MICROALBUMINURIA, WITH LONG-TERM CURRENT USE OF INSULIN (HCC): Primary | ICD-10-CM

## 2024-08-30 RX ORDER — GLUCOSAM/CHON-MSM1/C/MANG/BOSW 500-416.6
TABLET ORAL
Qty: 200 EACH | Refills: 3 | Status: SHIPPED | OUTPATIENT
Start: 2024-08-30

## 2024-08-30 NOTE — TELEPHONE ENCOUNTER
Patient states she spoke with Americo regarding her prescriptions for lancets. She states she has been receiving TruePlus Sterile Lancets, which do not fit her pen. She states she needs Accu-Chek Softclix Lancets instead. She states Americo is supposed to be giving us a call later today to update her prescription.

## 2024-09-03 RX ORDER — LANCETS
EACH MISCELLANEOUS
Qty: 200 EACH | Refills: 3 | Status: SHIPPED | OUTPATIENT
Start: 2024-09-03

## 2024-09-09 RX ORDER — AMLODIPINE BESYLATE 10 MG/1
10 TABLET ORAL DAILY
Qty: 90 TABLET | Refills: 0 | Status: SHIPPED | OUTPATIENT
Start: 2024-09-09

## 2024-09-29 DIAGNOSIS — E11.29 TYPE 2 DIABETES MELLITUS WITH MICROALBUMINURIA, WITH LONG-TERM CURRENT USE OF INSULIN (HCC): ICD-10-CM

## 2024-09-29 DIAGNOSIS — Z79.4 TYPE 2 DIABETES MELLITUS WITH MICROALBUMINURIA, WITH LONG-TERM CURRENT USE OF INSULIN (HCC): ICD-10-CM

## 2024-09-29 DIAGNOSIS — R80.9 TYPE 2 DIABETES MELLITUS WITH MICROALBUMINURIA, WITH LONG-TERM CURRENT USE OF INSULIN (HCC): ICD-10-CM

## 2024-09-30 RX ORDER — METFORMIN HCL 500 MG
TABLET, EXTENDED RELEASE 24 HR ORAL
Qty: 90 TABLET | Refills: 0 | Status: SHIPPED | OUTPATIENT
Start: 2024-09-30

## 2024-09-30 RX ORDER — ATORVASTATIN CALCIUM 80 MG/1
80 TABLET, FILM COATED ORAL DAILY
Qty: 90 TABLET | Refills: 0 | Status: SHIPPED | OUTPATIENT
Start: 2024-09-30

## 2024-09-30 NOTE — TELEPHONE ENCOUNTER
1 Fill with 0 RF. Please call pt to help schedule IN CLINIC appointment before this fill runs out.

## 2024-10-07 ENCOUNTER — OFFICE VISIT (OUTPATIENT)
Age: 84
End: 2024-10-07
Payer: MEDICARE

## 2024-10-07 VITALS
WEIGHT: 168.8 LBS | SYSTOLIC BLOOD PRESSURE: 135 MMHG | HEIGHT: 65 IN | TEMPERATURE: 97.5 F | RESPIRATION RATE: 18 BRPM | BODY MASS INDEX: 28.12 KG/M2 | DIASTOLIC BLOOD PRESSURE: 65 MMHG | OXYGEN SATURATION: 97 % | HEART RATE: 91 BPM

## 2024-10-07 DIAGNOSIS — I10 ESSENTIAL HYPERTENSION: ICD-10-CM

## 2024-10-07 DIAGNOSIS — R80.9 TYPE 2 DIABETES MELLITUS WITH MICROALBUMINURIA, WITH LONG-TERM CURRENT USE OF INSULIN (HCC): Primary | ICD-10-CM

## 2024-10-07 DIAGNOSIS — E78.00 HYPERCHOLESTEREMIA: ICD-10-CM

## 2024-10-07 DIAGNOSIS — Z79.4 TYPE 2 DIABETES MELLITUS WITH MICROALBUMINURIA, WITH LONG-TERM CURRENT USE OF INSULIN (HCC): Primary | ICD-10-CM

## 2024-10-07 DIAGNOSIS — E11.29 TYPE 2 DIABETES MELLITUS WITH MICROALBUMINURIA, WITH LONG-TERM CURRENT USE OF INSULIN (HCC): Primary | ICD-10-CM

## 2024-10-07 PROCEDURE — G8417 CALC BMI ABV UP PARAM F/U: HCPCS | Performed by: PHYSICIAN ASSISTANT

## 2024-10-07 PROCEDURE — 3044F HG A1C LEVEL LT 7.0%: CPT | Performed by: PHYSICIAN ASSISTANT

## 2024-10-07 PROCEDURE — G8399 PT W/DXA RESULTS DOCUMENT: HCPCS | Performed by: PHYSICIAN ASSISTANT

## 2024-10-07 PROCEDURE — 1036F TOBACCO NON-USER: CPT | Performed by: PHYSICIAN ASSISTANT

## 2024-10-07 PROCEDURE — 1090F PRES/ABSN URINE INCON ASSESS: CPT | Performed by: PHYSICIAN ASSISTANT

## 2024-10-07 PROCEDURE — 99214 OFFICE O/P EST MOD 30 MIN: CPT | Performed by: PHYSICIAN ASSISTANT

## 2024-10-07 PROCEDURE — G8484 FLU IMMUNIZE NO ADMIN: HCPCS | Performed by: PHYSICIAN ASSISTANT

## 2024-10-07 PROCEDURE — 3075F SYST BP GE 130 - 139MM HG: CPT | Performed by: PHYSICIAN ASSISTANT

## 2024-10-07 PROCEDURE — G2211 COMPLEX E/M VISIT ADD ON: HCPCS | Performed by: PHYSICIAN ASSISTANT

## 2024-10-07 PROCEDURE — 3078F DIAST BP <80 MM HG: CPT | Performed by: PHYSICIAN ASSISTANT

## 2024-10-07 PROCEDURE — G8427 DOCREV CUR MEDS BY ELIG CLIN: HCPCS | Performed by: PHYSICIAN ASSISTANT

## 2024-10-07 PROCEDURE — 1123F ACP DISCUSS/DSCN MKR DOCD: CPT | Performed by: PHYSICIAN ASSISTANT

## 2024-10-07 RX ORDER — LOSARTAN POTASSIUM 50 MG/1
50 TABLET ORAL DAILY
Qty: 90 TABLET | Refills: 1 | Status: SHIPPED | OUTPATIENT
Start: 2024-10-07

## 2024-10-07 ASSESSMENT — ENCOUNTER SYMPTOMS
COUGH: 0
SHORTNESS OF BREATH: 0

## 2024-10-07 NOTE — PROGRESS NOTES
I have reviewed all needed documentation in preparation for visit. Verified patient by name and date of birth  Chief Complaint   Patient presents with    Follow-up    Medication Adjustment       Vitals:    10/07/24 0855   BP: 135/65   Site: Left Upper Arm   Position: Sitting   Cuff Size: Medium Adult   Pulse: 91   Resp: 18   Temp: 97.5 °F (36.4 °C)   TempSrc: Temporal   SpO2: 97%   Weight: 76.6 kg (168 lb 12.8 oz)   Height: 1.651 m (5' 5\")       Health Maintenance Due   Topic Date Due    Respiratory Syncytial Virus (RSV) Pregnant or age 60 yrs+ (1 - 1-dose 60+ series) Never done    Shingles vaccine (2 of 3) 11/26/2013    Diabetic retinal exam  05/22/2020    Annual Wellness Visit (Medicare Advantage)  Never done    Flu vaccine (1) 08/01/2024    COVID-19 Vaccine (4 - 2023-24 season) 09/01/2024    A1C test (Diabetic or Prediabetic)  10/16/2024     \"Have you been to the ER, urgent care clinic since your last visit?  Hospitalized since your last visit?\"    NO    “Have you seen or consulted any other health care providers outside of [facility] since your last visit?”    NO            Click Here for Release of Records Request         JOVAN Crouch

## 2024-10-07 NOTE — PROGRESS NOTES
Valerie Torres is a 83 y.o. female  Chief Complaint   Patient presents with    Follow-up    Medication Adjustment     Vitals:    10/07/24 0855   BP: 135/65   Pulse: 91   Resp: 18   Temp: 97.5 °F (36.4 °C)   SpO2: 97%      Wt Readings from Last 3 Encounters:   10/07/24 76.6 kg (168 lb 12.8 oz)   05/07/24 78 kg (172 lb)   04/16/24 77.1 kg (170 lb)     BMI Readings from Last 3 Encounters:   10/07/24 28.09 kg/m²   05/07/24 28.62 kg/m²   04/16/24 28.29 kg/m²     Health Maintenance Due   Topic Date Due    Respiratory Syncytial Virus (RSV) Pregnant or age 60 yrs+ (1 - 1-dose 60+ series) Never done    Shingles vaccine (2 of 3) 11/26/2013    Diabetic retinal exam  05/22/2020    Annual Wellness Visit (Medicare Advantage)  Never done    Flu vaccine (1) 08/01/2024    COVID-19 Vaccine (4 - 2023-24 season) 09/01/2024    A1C test (Diabetic or Prediabetic)  10/16/2024     HPI  CV follow up  BP controlled: Never started taking Amlodipine because was worried about taking a second BP med. Started splitting 100 mg Losartan in half last month to see if BP was controlled on this and it has been.  BP Readings from Last 3 Encounters:   10/07/24 135/65   05/07/24 129/72   04/16/24 (!) 145/65     Currently Rx'd:   Hypertension Medications       Angiotensin II Receptor Antagonists       losartan (COZAAR) 50 MG tablet Take 1 tablet by mouth daily            Creatinine (MG/DL)   Date Value   04/16/2024 0.92      Cholesterol Meds  atorvastatin - 80 MG  Lab Results   Component Value Date    .8 (H) 04/16/2024     LDL uncontrolled at last check.     DM II - controlled. Feeling well. -130 range qam.   Hemoglobin A1C, POC (%)   Date Value   04/17/2023 6.5      Hemoglobin A1C (%)   Date Value   04/16/2024 6.8 (H)     Lab Results   Component Value Date    .8 (H) 04/16/2024     Creatinine (MG/DL)   Date Value   04/16/2024 0.92     Currently taking Diabetes Meds   Diabetic Medications       Insulin       insulin glargine (LANTUS

## 2024-10-09 RX ORDER — ISOPROPYL ALCOHOL 70 ML/100ML
SWAB TOPICAL
Qty: 200 EACH | Refills: 3 | Status: SHIPPED | OUTPATIENT
Start: 2024-10-09

## 2024-10-10 DIAGNOSIS — E11.29 TYPE 2 DIABETES MELLITUS WITH MICROALBUMINURIA, WITH LONG-TERM CURRENT USE OF INSULIN (HCC): ICD-10-CM

## 2024-10-10 DIAGNOSIS — R80.9 TYPE 2 DIABETES MELLITUS WITH MICROALBUMINURIA, WITH LONG-TERM CURRENT USE OF INSULIN (HCC): ICD-10-CM

## 2024-10-10 DIAGNOSIS — Z79.4 TYPE 2 DIABETES MELLITUS WITH MICROALBUMINURIA, WITH LONG-TERM CURRENT USE OF INSULIN (HCC): ICD-10-CM

## 2024-10-10 LAB
ALBUMIN SERPL-MCNC: 4.1 G/DL (ref 3.5–5)
ALBUMIN/GLOB SERPL: 1.4 (ref 1.1–2.2)
ALP SERPL-CCNC: 110 U/L (ref 45–117)
ALT SERPL-CCNC: 30 U/L (ref 12–78)
ANION GAP SERPL CALC-SCNC: 4 MMOL/L (ref 2–12)
AST SERPL-CCNC: 17 U/L (ref 15–37)
BILIRUB SERPL-MCNC: 1 MG/DL (ref 0.2–1)
BUN SERPL-MCNC: 15 MG/DL (ref 6–20)
BUN/CREAT SERPL: 16 (ref 12–20)
CALCIUM SERPL-MCNC: 9.9 MG/DL (ref 8.5–10.1)
CHLORIDE SERPL-SCNC: 104 MMOL/L (ref 97–108)
CHOLEST SERPL-MCNC: 139 MG/DL
CO2 SERPL-SCNC: 29 MMOL/L (ref 21–32)
CREAT SERPL-MCNC: 0.93 MG/DL (ref 0.55–1.02)
EST. AVERAGE GLUCOSE BLD GHB EST-MCNC: 143 MG/DL
GLOBULIN SER CALC-MCNC: 3 G/DL (ref 2–4)
GLUCOSE SERPL-MCNC: 148 MG/DL (ref 65–100)
HBA1C MFR BLD: 6.6 % (ref 4–5.6)
HDLC SERPL-MCNC: 52 MG/DL
HDLC SERPL: 2.7 (ref 0–5)
LDLC SERPL CALC-MCNC: 52 MG/DL (ref 0–100)
POTASSIUM SERPL-SCNC: 4.2 MMOL/L (ref 3.5–5.1)
PROT SERPL-MCNC: 7.1 G/DL (ref 6.4–8.2)
SODIUM SERPL-SCNC: 137 MMOL/L (ref 136–145)
TRIGL SERPL-MCNC: 175 MG/DL
VLDLC SERPL CALC-MCNC: 35 MG/DL

## 2024-11-06 RX ORDER — BLOOD SUGAR DIAGNOSTIC
STRIP MISCELLANEOUS
Qty: 200 STRIP | Refills: 3 | Status: SHIPPED | OUTPATIENT
Start: 2024-11-06

## 2024-12-12 DIAGNOSIS — E11.29 TYPE 2 DIABETES MELLITUS WITH MICROALBUMINURIA, WITH LONG-TERM CURRENT USE OF INSULIN (HCC): ICD-10-CM

## 2024-12-12 DIAGNOSIS — R80.9 TYPE 2 DIABETES MELLITUS WITH MICROALBUMINURIA, WITH LONG-TERM CURRENT USE OF INSULIN (HCC): ICD-10-CM

## 2024-12-12 DIAGNOSIS — Z79.4 TYPE 2 DIABETES MELLITUS WITH MICROALBUMINURIA, WITH LONG-TERM CURRENT USE OF INSULIN (HCC): ICD-10-CM

## 2024-12-12 DIAGNOSIS — E11.29 TYPE 2 DIABETES MELLITUS WITH OTHER DIABETIC KIDNEY COMPLICATION (HCC): ICD-10-CM

## 2024-12-12 RX ORDER — ATORVASTATIN CALCIUM 80 MG/1
80 TABLET, FILM COATED ORAL DAILY
Qty: 90 TABLET | Refills: 1 | Status: SHIPPED | OUTPATIENT
Start: 2024-12-12

## 2024-12-12 RX ORDER — PEN NEEDLE, DIABETIC 32GX 5/32"
NEEDLE, DISPOSABLE MISCELLANEOUS
Qty: 100 EACH | Refills: 3 | Status: SHIPPED | OUTPATIENT
Start: 2024-12-12

## 2024-12-12 RX ORDER — METFORMIN HYDROCHLORIDE 500 MG/1
TABLET, EXTENDED RELEASE ORAL
Qty: 90 TABLET | Refills: 1 | Status: SHIPPED | OUTPATIENT
Start: 2024-12-12

## 2025-03-10 DIAGNOSIS — I10 ESSENTIAL HYPERTENSION: ICD-10-CM

## 2025-03-10 RX ORDER — LOSARTAN POTASSIUM 50 MG/1
TABLET ORAL
Qty: 90 TABLET | Refills: 0 | Status: SHIPPED | OUTPATIENT
Start: 2025-03-10 | End: 2025-04-07 | Stop reason: DRUGHIGH

## 2025-03-10 NOTE — TELEPHONE ENCOUNTER
Refill request received from [facility]    for   Requested Prescriptions     Pending Prescriptions Disp Refills    losartan (COZAAR) 50 MG tablet [Pharmacy Med Name: Losartan Potassium Oral Tablet 50 MG] 90 tablet 3     Sig: TAKE 1 TABLET EVERY DAY (DOSE CHANGE)     Last office visit: 10/7/2024   Next office visit: 4/7/2025     Routed to JESUS Diego for review.     Ayana Farris LPN

## 2025-03-11 DIAGNOSIS — Z79.4 TYPE 2 DIABETES MELLITUS WITH MICROALBUMINURIA, WITH LONG-TERM CURRENT USE OF INSULIN (HCC): ICD-10-CM

## 2025-03-11 DIAGNOSIS — R80.9 TYPE 2 DIABETES MELLITUS WITH MICROALBUMINURIA, WITH LONG-TERM CURRENT USE OF INSULIN (HCC): ICD-10-CM

## 2025-03-11 DIAGNOSIS — E11.29 TYPE 2 DIABETES MELLITUS WITH MICROALBUMINURIA, WITH LONG-TERM CURRENT USE OF INSULIN (HCC): ICD-10-CM

## 2025-03-11 RX ORDER — ATORVASTATIN CALCIUM 80 MG/1
TABLET, FILM COATED ORAL
Refills: 0 | OUTPATIENT
Start: 2025-03-11

## 2025-03-11 RX ORDER — METFORMIN HYDROCHLORIDE 500 MG/1
TABLET, EXTENDED RELEASE ORAL
Qty: 90 TABLET | Refills: 0 | Status: SHIPPED | OUTPATIENT
Start: 2025-03-11

## 2025-03-11 RX ORDER — ATORVASTATIN CALCIUM 80 MG/1
80 TABLET, FILM COATED ORAL DAILY
Qty: 90 TABLET | Refills: 0 | Status: SHIPPED | OUTPATIENT
Start: 2025-03-11

## 2025-03-11 RX ORDER — METFORMIN HYDROCHLORIDE 500 MG/1
TABLET, EXTENDED RELEASE ORAL
Refills: 0 | OUTPATIENT
Start: 2025-03-11

## 2025-03-11 NOTE — TELEPHONE ENCOUNTER
Refill request received from [facility] for   Requested Prescriptions     Pending Prescriptions Disp Refills    atorvastatin (LIPITOR) 80 MG tablet 90 tablet 1     Sig: Take 1 tablet by mouth daily    metFORMIN (GLUCOPHAGE-XR) 500 MG extended release tablet 90 tablet 1     Sig: TAKE 1 TABLET EVERY DAY WITH DINNER     Last office visit: 10/7/2024   Next office visit: 4/7/2025     Routed to JESUS Diego for review.         Preeti Cameron Cma

## 2025-04-07 ENCOUNTER — OFFICE VISIT (OUTPATIENT)
Age: 85
End: 2025-04-07
Payer: MEDICARE

## 2025-04-07 VITALS
DIASTOLIC BLOOD PRESSURE: 81 MMHG | SYSTOLIC BLOOD PRESSURE: 178 MMHG | OXYGEN SATURATION: 100 % | RESPIRATION RATE: 18 BRPM | HEIGHT: 65 IN | TEMPERATURE: 98 F | WEIGHT: 173 LBS | HEART RATE: 80 BPM | BODY MASS INDEX: 28.82 KG/M2

## 2025-04-07 DIAGNOSIS — Z79.4 TYPE 2 DIABETES MELLITUS WITH MICROALBUMINURIA, WITH LONG-TERM CURRENT USE OF INSULIN (HCC): ICD-10-CM

## 2025-04-07 DIAGNOSIS — F32.A MILD DEPRESSION: ICD-10-CM

## 2025-04-07 DIAGNOSIS — E11.29 TYPE 2 DIABETES MELLITUS WITH MICROALBUMINURIA, WITH LONG-TERM CURRENT USE OF INSULIN (HCC): ICD-10-CM

## 2025-04-07 DIAGNOSIS — Z00.00 INITIAL MEDICARE ANNUAL WELLNESS VISIT: Primary | ICD-10-CM

## 2025-04-07 DIAGNOSIS — R80.9 TYPE 2 DIABETES MELLITUS WITH MICROALBUMINURIA, WITH LONG-TERM CURRENT USE OF INSULIN (HCC): ICD-10-CM

## 2025-04-07 DIAGNOSIS — I10 ESSENTIAL HYPERTENSION: ICD-10-CM

## 2025-04-07 DIAGNOSIS — E78.00 HYPERCHOLESTEREMIA: ICD-10-CM

## 2025-04-07 LAB
ALBUMIN SERPL-MCNC: 3.8 G/DL (ref 3.5–5)
ALBUMIN/GLOB SERPL: 1.1 (ref 1.1–2.2)
ALP SERPL-CCNC: 102 U/L (ref 45–117)
ALT SERPL-CCNC: 27 U/L (ref 12–78)
ANION GAP SERPL CALC-SCNC: 7 MMOL/L (ref 2–12)
AST SERPL-CCNC: 22 U/L (ref 15–37)
BASOPHILS # BLD: 0.02 K/UL (ref 0–0.1)
BASOPHILS NFR BLD: 0.3 % (ref 0–1)
BILIRUB SERPL-MCNC: 0.9 MG/DL (ref 0.2–1)
BUN SERPL-MCNC: 14 MG/DL (ref 6–20)
BUN/CREAT SERPL: 14 (ref 12–20)
CALCIUM SERPL-MCNC: 9.6 MG/DL (ref 8.5–10.1)
CHLORIDE SERPL-SCNC: 104 MMOL/L (ref 97–108)
CHOLEST SERPL-MCNC: 140 MG/DL
CO2 SERPL-SCNC: 29 MMOL/L (ref 21–32)
CREAT SERPL-MCNC: 0.97 MG/DL (ref 0.55–1.02)
CREAT UR-MCNC: 84 MG/DL
DIFFERENTIAL METHOD BLD: ABNORMAL
EOSINOPHIL # BLD: 0.04 K/UL (ref 0–0.4)
EOSINOPHIL NFR BLD: 0.6 % (ref 0–7)
ERYTHROCYTE [DISTWIDTH] IN BLOOD BY AUTOMATED COUNT: 12.8 % (ref 11.5–14.5)
EST. AVERAGE GLUCOSE BLD GHB EST-MCNC: 160 MG/DL
GLOBULIN SER CALC-MCNC: 3.4 G/DL (ref 2–4)
GLUCOSE SERPL-MCNC: 154 MG/DL (ref 65–100)
HBA1C MFR BLD: 7.2 % (ref 4–5.6)
HCT VFR BLD AUTO: 41.8 % (ref 35–47)
HDLC SERPL-MCNC: 52 MG/DL
HDLC SERPL: 2.7 (ref 0–5)
HGB BLD-MCNC: 13.4 G/DL (ref 11.5–16)
IMM GRANULOCYTES # BLD AUTO: 0.07 K/UL (ref 0–0.04)
IMM GRANULOCYTES NFR BLD AUTO: 1.1 % (ref 0–0.5)
LDLC SERPL CALC-MCNC: 50.6 MG/DL (ref 0–100)
LYMPHOCYTES # BLD: 1.39 K/UL (ref 0.8–3.5)
LYMPHOCYTES NFR BLD: 22 % (ref 12–49)
MCH RBC QN AUTO: 29.1 PG (ref 26–34)
MCHC RBC AUTO-ENTMCNC: 32.1 G/DL (ref 30–36.5)
MCV RBC AUTO: 90.9 FL (ref 80–99)
MICROALBUMIN UR-MCNC: 4.02 MG/DL
MICROALBUMIN/CREAT UR-RTO: 48 MG/G (ref 0–30)
MONOCYTES # BLD: 0.29 K/UL (ref 0–1)
MONOCYTES NFR BLD: 4.6 % (ref 5–13)
NEUTS SEG # BLD: 4.51 K/UL (ref 1.8–8)
NEUTS SEG NFR BLD: 71.4 % (ref 32–75)
NRBC # BLD: 0 K/UL (ref 0–0.01)
NRBC BLD-RTO: 0 PER 100 WBC
PLATELET # BLD AUTO: 164 K/UL (ref 150–400)
PMV BLD AUTO: 11.3 FL (ref 8.9–12.9)
POTASSIUM SERPL-SCNC: 4.1 MMOL/L (ref 3.5–5.1)
PROT SERPL-MCNC: 7.2 G/DL (ref 6.4–8.2)
RBC # BLD AUTO: 4.6 M/UL (ref 3.8–5.2)
SODIUM SERPL-SCNC: 140 MMOL/L (ref 136–145)
SPECIMEN HOLD: NORMAL
TRIGL SERPL-MCNC: 187 MG/DL
TSH SERPL DL<=0.05 MIU/L-ACNC: 1.46 UIU/ML (ref 0.36–3.74)
VLDLC SERPL CALC-MCNC: 37.4 MG/DL
WBC # BLD AUTO: 6.3 K/UL (ref 3.6–11)

## 2025-04-07 PROCEDURE — G8399 PT W/DXA RESULTS DOCUMENT: HCPCS | Performed by: PHYSICIAN ASSISTANT

## 2025-04-07 PROCEDURE — G8427 DOCREV CUR MEDS BY ELIG CLIN: HCPCS | Performed by: PHYSICIAN ASSISTANT

## 2025-04-07 PROCEDURE — 1159F MED LIST DOCD IN RCRD: CPT | Performed by: PHYSICIAN ASSISTANT

## 2025-04-07 PROCEDURE — 3079F DIAST BP 80-89 MM HG: CPT | Performed by: PHYSICIAN ASSISTANT

## 2025-04-07 PROCEDURE — G0438 PPPS, INITIAL VISIT: HCPCS | Performed by: PHYSICIAN ASSISTANT

## 2025-04-07 PROCEDURE — 3077F SYST BP >= 140 MM HG: CPT | Performed by: PHYSICIAN ASSISTANT

## 2025-04-07 PROCEDURE — 1160F RVW MEDS BY RX/DR IN RCRD: CPT | Performed by: PHYSICIAN ASSISTANT

## 2025-04-07 PROCEDURE — 1090F PRES/ABSN URINE INCON ASSESS: CPT | Performed by: PHYSICIAN ASSISTANT

## 2025-04-07 PROCEDURE — 99214 OFFICE O/P EST MOD 30 MIN: CPT | Performed by: PHYSICIAN ASSISTANT

## 2025-04-07 PROCEDURE — G8417 CALC BMI ABV UP PARAM F/U: HCPCS | Performed by: PHYSICIAN ASSISTANT

## 2025-04-07 PROCEDURE — 1123F ACP DISCUSS/DSCN MKR DOCD: CPT | Performed by: PHYSICIAN ASSISTANT

## 2025-04-07 PROCEDURE — 1126F AMNT PAIN NOTED NONE PRSNT: CPT | Performed by: PHYSICIAN ASSISTANT

## 2025-04-07 PROCEDURE — 1036F TOBACCO NON-USER: CPT | Performed by: PHYSICIAN ASSISTANT

## 2025-04-07 RX ORDER — ATORVASTATIN CALCIUM 80 MG/1
80 TABLET, FILM COATED ORAL DAILY
Qty: 90 TABLET | Refills: 1 | Status: SHIPPED | OUTPATIENT
Start: 2025-04-07

## 2025-04-07 RX ORDER — METFORMIN HYDROCHLORIDE 500 MG/1
TABLET, EXTENDED RELEASE ORAL
Qty: 90 TABLET | Refills: 1 | Status: SHIPPED | OUTPATIENT
Start: 2025-04-07

## 2025-04-07 RX ORDER — LOSARTAN POTASSIUM 100 MG/1
100 TABLET ORAL DAILY
Qty: 90 TABLET | Refills: 1
Start: 2025-04-07

## 2025-04-07 RX ORDER — INSULIN GLARGINE 100 [IU]/ML
INJECTION, SOLUTION SUBCUTANEOUS
Qty: 6 ADJUSTABLE DOSE PRE-FILLED PEN SYRINGE | Refills: 1 | Status: SHIPPED | OUTPATIENT
Start: 2025-04-07

## 2025-04-07 SDOH — ECONOMIC STABILITY: FOOD INSECURITY: WITHIN THE PAST 12 MONTHS, YOU WORRIED THAT YOUR FOOD WOULD RUN OUT BEFORE YOU GOT MONEY TO BUY MORE.: NEVER TRUE

## 2025-04-07 SDOH — ECONOMIC STABILITY: FOOD INSECURITY: WITHIN THE PAST 12 MONTHS, THE FOOD YOU BOUGHT JUST DIDN'T LAST AND YOU DIDN'T HAVE MONEY TO GET MORE.: NEVER TRUE

## 2025-04-07 ASSESSMENT — PATIENT HEALTH QUESTIONNAIRE - PHQ9
SUM OF ALL RESPONSES TO PHQ QUESTIONS 1-9: 2
1. LITTLE INTEREST OR PLEASURE IN DOING THINGS: SEVERAL DAYS
2. FEELING DOWN, DEPRESSED OR HOPELESS: SEVERAL DAYS
SUM OF ALL RESPONSES TO PHQ QUESTIONS 1-9: 2

## 2025-04-07 ASSESSMENT — ENCOUNTER SYMPTOMS
SHORTNESS OF BREATH: 0
COUGH: 0

## 2025-04-07 ASSESSMENT — LIFESTYLE VARIABLES
HOW OFTEN DO YOU HAVE A DRINK CONTAINING ALCOHOL: NEVER
HOW MANY STANDARD DRINKS CONTAINING ALCOHOL DO YOU HAVE ON A TYPICAL DAY: PATIENT DOES NOT DRINK

## 2025-04-07 NOTE — PROGRESS NOTES
Intersecting Shapes Test    Patient was asked to copy a drawing of Intersecting Pentagons (an alternative cognitive screen to the clock drawing test)    To get a score of 1, there had to be two figures that seem to intersect. At least one of the figures had to have five angles. Tremor, rotation, relative size, and symmetry in the drawing were ignored.    Result: 1/1    References:  https://pmc.ncbi.nlm.nih.gov/articles/JIU3010875/  https://www.sciencedirect.com/science/article/pii/S5895700351938696   
I have reviewed all needed documentation in preparation for visit. Verified patient by name and date of birth  Chief Complaint   Patient presents with    Medicare AWV     No concerns        Vitals:    04/07/25 0931   BP: (!) 178/81   BP Site: Right Upper Arm   Patient Position: Sitting   BP Cuff Size: Medium Adult   Pulse: 80   Resp: 18   Temp: 98 °F (36.7 °C)   TempSrc: Temporal   SpO2: 100%   Weight: 78.5 kg (173 lb)   Height: 1.651 m (5' 5\")       Health Maintenance Due   Topic Date Due    Shingles vaccine (2 of 3) 11/26/2013    Respiratory Syncytial Virus (RSV) Pregnant or age 60 yrs+ (1 - 1-dose 75+ series) Never done    Diabetic retinal exam  05/22/2020    COVID-19 Vaccine (4 - 2024-25 season) 09/01/2024    Annual Wellness Visit (Medicare Advantage)  Never done    A1C test (Diabetic or Prediabetic)  04/10/2025    Diabetic Alb to Cr ratio (uACR) test  04/16/2025    Depression Screen  04/16/2025     \"Have you been to the ER, urgent care clinic since your last visit?  Hospitalized since your last visit?\"    NO    “Have you seen or consulted any other health care providers outside of [facility] since your last visit?”    NO            Click Here for Release of Records Request         Marixa le CMA  
MD Dmitri   ACCU-CHEK GUIDE strip TEST BLOOD SUGAR TWICE DAILY Yes Maye Chan,    Alcohol Swabs (DROPSAFE ALCOHOL PREP) 70 % PADS USE TWO TIMES DAILY AS DIRECTED Yes Debbie De Anda PA-C   Accu-Chek Softclix Lancets MISC Test blood sugar twice daily. Dx E11.29 Yes Debbie De Anda PA-C   Multiple Vitamins-Minerals (MULTIPLE VITAMINS/WOMENS) TABS Take 1 tablet by mouth daily Yes Provider, MD Ibeth   insulin glargine (LANTUS SOLOSTAR) 100 UNIT/ML injection pen INJECT 20 UNITS UNDER THE SKIN ONCE DAILY. Yes Debbie De Anda PA-C   Cranberry-Vitamin C-Probiotic (AZO CRANBERRY PO) Take by mouth Yes Provider, MD Ibeth   cetirizine (ZYRTEC) 10 MG tablet Take by mouth Yes Automatic Reconciliation, Ar       CareTeam (Including outside providers/suppliers regularly involved in providing care):   Patient Care Team:  Debbie De Anda PA-C as PCP - General  Debbie De Anda PA-C as PCP - Empaneled Provider     Recommendations for Preventive Services Due: see orders and patient instructions/AVS.  Recommended screening schedule for the next 5-10 years is provided to the patient in written form: see Patient Instructions/AVS.     Reviewed and updated this visit:  Tobacco  Allergies  Meds  Problems  Med Hx  Surg Hx  Fam Hx  Sexual   Hx                  
long-term current use of insulin (HCC)  -     Hemoglobin A1C; Future  -     Comprehensive Metabolic Panel; Future  -     Lipid Panel; Future  -     Albumin/Creatinine Ratio, Urine; Future  -     metFORMIN (GLUCOPHAGE-XR) 500 MG extended release tablet; TAKE 1 TABLET EVERY DAY WITH DINNER, Disp-90 tablet, R-1Normal  -     insulin glargine (LANTUS SOLOSTAR) 100 UNIT/ML injection pen; INJECT 20 UNITS UNDER THE SKIN ONCE DAILY., Disp-6 Adjustable Dose Pre-filled Pen Syringe, R-1Normal  Mild depression  -     TSH; Future  -     CBC with Auto Differential; Future  Hypercholesteremia  -     atorvastatin (LIPITOR) 80 MG tablet; Take 1 tablet by mouth daily, Disp-90 tablet, R-1Normal     Refills Sent.

## 2025-04-10 ENCOUNTER — RESULTS FOLLOW-UP (OUTPATIENT)
Age: 85
End: 2025-04-10

## 2025-04-10 DIAGNOSIS — R79.89 ABNORMAL CBC: Primary | ICD-10-CM

## 2025-04-14 DIAGNOSIS — R79.89 ABNORMAL CBC: ICD-10-CM

## 2025-04-16 LAB — PERIPHERAL SMEAR, MD REVIEW: NORMAL

## 2025-04-21 ENCOUNTER — RESULTS FOLLOW-UP (OUTPATIENT)
Age: 85
End: 2025-04-21

## 2025-04-22 ENCOUNTER — OFFICE VISIT (OUTPATIENT)
Age: 85
End: 2025-04-22
Payer: MEDICARE

## 2025-04-22 VITALS
OXYGEN SATURATION: 99 % | HEIGHT: 65 IN | SYSTOLIC BLOOD PRESSURE: 132 MMHG | RESPIRATION RATE: 18 BRPM | WEIGHT: 174 LBS | BODY MASS INDEX: 28.99 KG/M2 | TEMPERATURE: 97.9 F | HEART RATE: 76 BPM | DIASTOLIC BLOOD PRESSURE: 60 MMHG

## 2025-04-22 DIAGNOSIS — L25.5 CONTACT DERMATITIS DUE TO PLANT: ICD-10-CM

## 2025-04-22 DIAGNOSIS — I10 ESSENTIAL HYPERTENSION: Primary | ICD-10-CM

## 2025-04-22 PROCEDURE — 1090F PRES/ABSN URINE INCON ASSESS: CPT | Performed by: PHYSICIAN ASSISTANT

## 2025-04-22 PROCEDURE — 3075F SYST BP GE 130 - 139MM HG: CPT | Performed by: PHYSICIAN ASSISTANT

## 2025-04-22 PROCEDURE — G8399 PT W/DXA RESULTS DOCUMENT: HCPCS | Performed by: PHYSICIAN ASSISTANT

## 2025-04-22 PROCEDURE — 1126F AMNT PAIN NOTED NONE PRSNT: CPT | Performed by: PHYSICIAN ASSISTANT

## 2025-04-22 PROCEDURE — G8427 DOCREV CUR MEDS BY ELIG CLIN: HCPCS | Performed by: PHYSICIAN ASSISTANT

## 2025-04-22 PROCEDURE — 1160F RVW MEDS BY RX/DR IN RCRD: CPT | Performed by: PHYSICIAN ASSISTANT

## 2025-04-22 PROCEDURE — G8417 CALC BMI ABV UP PARAM F/U: HCPCS | Performed by: PHYSICIAN ASSISTANT

## 2025-04-22 PROCEDURE — 99214 OFFICE O/P EST MOD 30 MIN: CPT | Performed by: PHYSICIAN ASSISTANT

## 2025-04-22 PROCEDURE — 1159F MED LIST DOCD IN RCRD: CPT | Performed by: PHYSICIAN ASSISTANT

## 2025-04-22 PROCEDURE — 1123F ACP DISCUSS/DSCN MKR DOCD: CPT | Performed by: PHYSICIAN ASSISTANT

## 2025-04-22 PROCEDURE — 1036F TOBACCO NON-USER: CPT | Performed by: PHYSICIAN ASSISTANT

## 2025-04-22 PROCEDURE — 3078F DIAST BP <80 MM HG: CPT | Performed by: PHYSICIAN ASSISTANT

## 2025-04-22 PROCEDURE — G2211 COMPLEX E/M VISIT ADD ON: HCPCS | Performed by: PHYSICIAN ASSISTANT

## 2025-04-22 RX ORDER — METHYLPREDNISOLONE 4 MG/1
TABLET ORAL
Qty: 1 KIT | Refills: 0 | Status: SHIPPED | OUTPATIENT
Start: 2025-04-22

## 2025-04-22 RX ORDER — TRIAMCINOLONE ACETONIDE 1 MG/G
CREAM TOPICAL
Qty: 45 G | Refills: 1 | Status: SHIPPED | OUTPATIENT
Start: 2025-04-22 | End: 2025-05-01

## 2025-04-22 ASSESSMENT — ENCOUNTER SYMPTOMS
SHORTNESS OF BREATH: 0
COUGH: 0

## 2025-04-22 NOTE — PROGRESS NOTES
I have reviewed all needed documentation in preparation for visit. Verified patient by name and date of birth  Chief Complaint   Patient presents with    Follow-up     No concerns        Vitals:    04/22/25 1101   BP: (!) 142/66   BP Site: Right Upper Arm   Patient Position: Sitting   BP Cuff Size: Medium Adult   Pulse: 76   Resp: 18   Temp: 97.9 °F (36.6 °C)   TempSrc: Temporal   SpO2: 99%   Weight: 78.9 kg (174 lb)   Height: 1.651 m (5' 5\")       Health Maintenance Due   Topic Date Due    Shingles vaccine (2 of 3) 11/26/2013    Respiratory Syncytial Virus (RSV) Pregnant or age 60 yrs+ (1 - 1-dose 75+ series) Never done    Diabetic retinal exam  05/22/2020    COVID-19 Vaccine (4 - 2024-25 season) 09/01/2024     \"Have you been to the ER, urgent care clinic since your last visit?  Hospitalized since your last visit?\"    NO    “Have you seen or consulted any other health care providers outside of [facility] since your last visit?”    NO            Click Here for Release of Records Request         Marixa le CMA

## 2025-04-22 NOTE — PROGRESS NOTES
Valerie Torres is a 84 y.o. female  Chief Complaint   Patient presents with    Follow-up     No concerns      Vitals:    04/22/25 1101 04/22/25 1130   BP: (!) 142/66 132/60   BP Site: Right Upper Arm    Patient Position: Sitting    BP Cuff Size: Medium Adult    Pulse: 76    Resp: 18    Temp: 97.9 °F (36.6 °C)    TempSrc: Temporal    SpO2: 99%    Weight: 78.9 kg (174 lb)    Height: 1.651 m (5' 5\")       Wt Readings from Last 3 Encounters:   04/22/25 78.9 kg (174 lb)   04/07/25 78.5 kg (173 lb)   10/07/24 76.6 kg (168 lb 12.8 oz)     BMI Readings from Last 3 Encounters:   04/22/25 28.96 kg/m²   04/07/25 28.79 kg/m²   10/07/24 28.09 kg/m²     Health Maintenance Due   Topic Date Due    Shingles vaccine (2 of 3) 11/26/2013    Respiratory Syncytial Virus (RSV) Pregnant or age 60 yrs+ (1 - 1-dose 75+ series) Never done    Diabetic retinal exam  05/22/2020    COVID-19 Vaccine (4 - 2024-25 season) 09/01/2024     HPI  CV follow up. Increased dose to Losartan 100 mg at last visit 4/7.   BP now controlled: no side effects  BP Readings from Last 3 Encounters:   04/22/25 132/60   04/07/25 (!) 178/81   10/07/24 135/65     Currently taking:   Hypertension Medications       Angiotensin II Receptor Antagonists       losartan (COZAAR) 100 MG tablet Take 1 tablet by mouth daily            Creatinine (MG/DL)   Date Value   04/07/2025 0.97      Was working in yard removing Proficiency. Rash on R hand and R face. Itchy. No SOB.     ROS  Review of Systems   Constitutional:  Negative for fever.   Respiratory:  Negative for cough and shortness of breath.    Cardiovascular:  Negative for chest pain and palpitations.   Skin:  Positive for rash.   Neurological:  Negative for headaches.   Psychiatric/Behavioral:  Negative for dysphoric mood.      EXAM  Physical Exam  Vitals and nursing note reviewed.   Constitutional:       Appearance: Normal appearance.   HENT:      Head: Normocephalic and atraumatic.   Neck:      Vascular: No carotid bruit.

## 2025-08-28 RX ORDER — BLOOD SUGAR DIAGNOSTIC
STRIP MISCELLANEOUS
Qty: 200 STRIP | Refills: 3 | Status: SHIPPED | OUTPATIENT
Start: 2025-08-28